# Patient Record
Sex: FEMALE | Race: BLACK OR AFRICAN AMERICAN | NOT HISPANIC OR LATINO | Employment: FULL TIME | URBAN - METROPOLITAN AREA
[De-identification: names, ages, dates, MRNs, and addresses within clinical notes are randomized per-mention and may not be internally consistent; named-entity substitution may affect disease eponyms.]

---

## 2017-02-21 RX ORDER — LABETALOL 200 MG/1
TABLET, FILM COATED ORAL
Qty: 360 TABLET | Refills: 0 | Status: SHIPPED | OUTPATIENT
Start: 2017-02-21 | End: 2017-06-13 | Stop reason: SDUPTHER

## 2017-06-13 RX ORDER — LABETALOL 200 MG/1
TABLET, FILM COATED ORAL
Qty: 360 TABLET | Refills: 0 | Status: SHIPPED | OUTPATIENT
Start: 2017-06-13 | End: 2017-10-12 | Stop reason: SDUPTHER

## 2017-06-13 RX ORDER — NIFEDIPINE 90 MG/1
TABLET, EXTENDED RELEASE ORAL
Qty: 90 TABLET | Refills: 0 | Status: SHIPPED | OUTPATIENT
Start: 2017-06-13 | End: 2017-10-12 | Stop reason: SDUPTHER

## 2017-06-21 ENCOUNTER — OFFICE VISIT (OUTPATIENT)
Dept: CARDIOLOGY | Facility: CLINIC | Age: 57
End: 2017-06-21

## 2017-06-21 VITALS
SYSTOLIC BLOOD PRESSURE: 140 MMHG | HEIGHT: 61 IN | DIASTOLIC BLOOD PRESSURE: 80 MMHG | BODY MASS INDEX: 22.66 KG/M2 | WEIGHT: 120 LBS | HEART RATE: 64 BPM

## 2017-06-21 DIAGNOSIS — R00.2 PALPITATIONS: ICD-10-CM

## 2017-06-21 DIAGNOSIS — I10 ESSENTIAL HYPERTENSION: Primary | ICD-10-CM

## 2017-06-21 PROCEDURE — 93000 ELECTROCARDIOGRAM COMPLETE: CPT | Performed by: INTERNAL MEDICINE

## 2017-06-21 PROCEDURE — 99214 OFFICE O/P EST MOD 30 MIN: CPT | Performed by: INTERNAL MEDICINE

## 2017-06-21 NOTE — PROGRESS NOTES
Subjective:     Encounter Date: 6/22/2017      Patient ID: Micheline Fournier is a 57 y.o. female.    Chief Complaint:  History of Present Illness    Dear Dr. Mcginnis,    I had the pleasure of seeing this patient in the office today for follow-up of her cardiac status.  As you know she has a history of hypertension as well as palpitations.    Since her last visit she's done generally fine without complaints.  The only thing she's noted is that some mornings, about an hour to 2 after she takes her morning medicines, she'll have some lightheadedness.  She takes her labetalol twice a day, and takes her nifedipine and spironolactone both in the morning.    This patient denies any chest pain, pressure, tightness, squeezing, or heartburn.  This patient has not experienced any feeling of  no presyncope or syncope.  She has rare palpitations that are not associated with any symptoms.  There has not been any orthopnea or PND, and no problems with lower extremity edema.  This patient denies any shortness of breath at rest or with activity and has not had any wheezing.  This patient has not had any problems with unexplained nausea or vomiting. The patient has continued to perform daily activities of living without any specific problem or change in the level of activity.  This patient has not been recently hospitalized for any reason.      The following portions of the patient's history were reviewed and updated as appropriate: allergies, current medications, past family history, past medical history, past social history, past surgical history and problem list.    Past Medical History:   Diagnosis Date   • Arrhythmia    • Hypertension    • Palpitations        No past surgical history on file.    Social History     Social History   • Marital status:      Spouse name: N/A   • Number of children: N/A   • Years of education: N/A     Occupational History   • Not on file.     Social History Main Topics   • Smoking status: Never  "Smoker   • Smokeless tobacco: Not on file   • Alcohol use Yes      Comment: occ.   • Drug use: Not on file      Comment: caffine use   • Sexual activity: Not on file     Other Topics Concern   • Not on file     Social History Narrative       Review of Systems   Constitution: Negative for chills, decreased appetite, fever and night sweats.   HENT: Negative for ear discharge, ear pain, hearing loss, nosebleeds and sore throat.    Eyes: Negative for blurred vision, double vision and pain.   Cardiovascular: Negative for cyanosis.   Respiratory: Negative for hemoptysis and sputum production.    Endocrine: Negative for cold intolerance and heat intolerance.   Hematologic/Lymphatic: Negative for adenopathy.   Skin: Negative for dry skin, itching, nail changes, rash and suspicious lesions.   Musculoskeletal: Negative for arthritis, gout, muscle cramps, muscle weakness, myalgias and neck pain.   Gastrointestinal: Negative for anorexia, bowel incontinence, constipation, diarrhea, dysphagia, hematemesis and jaundice.   Genitourinary: Negative for bladder incontinence, dysuria, flank pain, frequency, hematuria and nocturia.   Neurological: Negative for focal weakness, numbness, paresthesias and seizures.   Psychiatric/Behavioral: Negative for altered mental status, hallucinations, hypervigilance, suicidal ideas and thoughts of violence.   Allergic/Immunologic: Negative for persistent infections.         ECG 12 Lead  Date/Time: 6/21/2017 11:02 AM  Performed by: GUILLERMINA ALFARO III.  Authorized by: GUILLERMINA ALFARO III   Comparison: compared with previous ECG   Similar to previous ECG  Rhythm: sinus rhythm  Rate: normal  Conduction: conduction normal  ST Segments: ST segments normal  T Waves: T waves normal  QRS axis: normal  Other: no other findings  Clinical impression: normal ECG               Objective:     Vitals:    06/21/17 1012   BP: 140/80   Pulse: 64   Weight: 120 lb (54.4 kg)   Height: 61\" (154.9 cm)         Physical " Exam   Constitutional: She is oriented to person, place, and time. She appears well-developed and well-nourished. No distress.   HENT:   Head: Normocephalic and atraumatic.   Nose: Nose normal.   Mouth/Throat: Oropharynx is clear and moist.   Eyes: Conjunctivae and EOM are normal. Pupils are equal, round, and reactive to light. Right eye exhibits no discharge. Left eye exhibits no discharge.   Neck: Normal range of motion. Neck supple. No tracheal deviation present. No thyromegaly present.   Cardiovascular: Normal rate, regular rhythm, S1 normal, S2 normal, normal heart sounds and normal pulses.  Exam reveals no S3.    Pulmonary/Chest: Effort normal and breath sounds normal. No stridor. No respiratory distress. She exhibits no tenderness.   Abdominal: Soft. Bowel sounds are normal. She exhibits no distension and no mass. There is no tenderness. There is no rebound and no guarding.   Musculoskeletal: Normal range of motion. She exhibits no tenderness or deformity.   Lymphadenopathy:     She has no cervical adenopathy.   Neurological: She is alert and oriented to person, place, and time. She has normal reflexes.   Skin: Skin is warm and dry. No rash noted. She is not diaphoretic. No erythema.   Psychiatric: She has a normal mood and affect. Thought content normal.       Lab Review:           Performed      Assessment:          Diagnosis Plan   1. Essential hypertension  ECG 12 Lead   2. Palpitations  ECG 12 Lead          Plan:       1.  Hypertension-generally under good control.  She is having a little lightheadedness some mornings, and so I'm then have her move her neck felt pain to take in the evening to [place].  She will call me in 2 weeks to let me know if that helps  2.  Palpitations-rare palpitations, continue medical therapy    Thank you very much for allowing us to participate in the care of this pleasant patient.  Please don't hesitate to call if I can be of assistance in any way.      Current Outpatient  Prescriptions:   •  aspirin 81 MG tablet, Take 81 mg by mouth daily., Disp: , Rfl:   •  labetalol (NORMODYNE) 200 MG tablet, TAKE TWO TABLETS BY MOUTH TWICE DAILY, Disp: 360 tablet, Rfl: 0  •  MULTIPLE VITAMIN PO, Take  by mouth., Disp: , Rfl:   •  NIFEdipine XL (PROCARDIA XL) 90 MG 24 hr tablet, TAKE ONE TABLET BY MOUTH ONCE DAILY AS DIRECTED, Disp: 90 tablet, Rfl: 0  •  spironolactone (ALDACTONE) 25 MG tablet, Take 1 tablet by mouth Daily., Disp: 90 tablet, Rfl: 1

## 2017-06-21 NOTE — PATIENT INSTRUCTIONS
Move your nifedipine (Procardia) to each evening. Call me in 2 weeks to let me know if that helps.

## 2017-10-12 RX ORDER — LABETALOL 200 MG/1
TABLET, FILM COATED ORAL
Qty: 360 TABLET | Refills: 0 | Status: SHIPPED | OUTPATIENT
Start: 2017-10-12 | End: 2018-02-13 | Stop reason: SDUPTHER

## 2017-10-12 RX ORDER — NIFEDIPINE 90 MG/1
TABLET, EXTENDED RELEASE ORAL
Qty: 90 TABLET | Refills: 0 | Status: SHIPPED | OUTPATIENT
Start: 2017-10-12 | End: 2018-02-13 | Stop reason: SDUPTHER

## 2018-02-13 RX ORDER — SPIRONOLACTONE 25 MG/1
TABLET ORAL
Qty: 90 TABLET | Refills: 0 | Status: SHIPPED | OUTPATIENT
Start: 2018-02-13 | End: 2018-06-11 | Stop reason: SDUPTHER

## 2018-02-13 RX ORDER — NIFEDIPINE 90 MG/1
TABLET, FILM COATED, EXTENDED RELEASE ORAL
Qty: 90 TABLET | Refills: 0 | Status: SHIPPED | OUTPATIENT
Start: 2018-02-13 | End: 2018-07-13 | Stop reason: SDUPTHER

## 2018-02-13 RX ORDER — LABETALOL 200 MG/1
TABLET, FILM COATED ORAL
Qty: 360 TABLET | Refills: 0 | Status: SHIPPED | OUTPATIENT
Start: 2018-02-13 | End: 2018-06-11 | Stop reason: SDUPTHER

## 2018-06-11 RX ORDER — LABETALOL 200 MG/1
TABLET, FILM COATED ORAL
Qty: 360 TABLET | Refills: 0 | Status: SHIPPED | OUTPATIENT
Start: 2018-06-11 | End: 2018-09-26 | Stop reason: SDUPTHER

## 2018-06-11 RX ORDER — SPIRONOLACTONE 25 MG/1
TABLET ORAL
Qty: 90 TABLET | Refills: 0 | Status: SHIPPED | OUTPATIENT
Start: 2018-06-11 | End: 2018-09-26 | Stop reason: SDUPTHER

## 2018-07-11 ENCOUNTER — OFFICE VISIT (OUTPATIENT)
Dept: CARDIOLOGY | Facility: CLINIC | Age: 58
End: 2018-07-11

## 2018-07-11 VITALS
DIASTOLIC BLOOD PRESSURE: 82 MMHG | SYSTOLIC BLOOD PRESSURE: 138 MMHG | HEIGHT: 61 IN | HEART RATE: 58 BPM | BODY MASS INDEX: 23.41 KG/M2 | WEIGHT: 124 LBS

## 2018-07-11 DIAGNOSIS — I10 ESSENTIAL HYPERTENSION: Primary | ICD-10-CM

## 2018-07-11 DIAGNOSIS — R00.2 PALPITATIONS: ICD-10-CM

## 2018-07-11 PROCEDURE — 93000 ELECTROCARDIOGRAM COMPLETE: CPT | Performed by: INTERNAL MEDICINE

## 2018-07-11 PROCEDURE — 99213 OFFICE O/P EST LOW 20 MIN: CPT | Performed by: INTERNAL MEDICINE

## 2018-07-11 NOTE — PROGRESS NOTES
Subjective:     Encounter Date: 7/11/2018      Patient ID: Micheline Fournier is a 58 y.o. female.    Chief Complaint: HTN  History of Present Illness    Dear Dr. Mcginnis,    I had the pleasure of seeing this patient in the office today for follow-up of her cardiac status.  As you know she has a history of hypertension as well as palpitations.    Generally she's been doing great without any complaints.  She does note that with the hot weather that occasionally by the end of the day if she standing up most of the day that she will have some trivial lower extremity edema.  Outside of that, no other symptoms.This patient denies any chest pain, pressure, tightness, squeezing, or heartburn.  This patient has not experienced any feeling of palpitations, tachycardia or heart racing and no presyncope or syncope.  There has not been any problems with dizziness or lightheadedness.  There has not been any orthopnea or PND.  This patient denies any shortness of breath at rest or with activity and has not had any wheezing.  This patient has not had any problems with unexplained nausea or vomiting. The patient has continued to perform daily activities of living without any specific problem or change in the level of activity.  This patient has not been recently hospitalized for any reason.    This patient has no known cardiac history.  This patient has no history of coronary artery disease, congestive heart failure, rheumatic fever, rheumatic heart disease, congenital heart disease or heart murmur.  This patient has never required invasive cardiovascular evaluation.        The following portions of the patient's history were reviewed and updated as appropriate: allergies, current medications, past family history, past medical history, past social history, past surgical history and problem list.    Past Medical History:   Diagnosis Date   • Arrhythmia    • Hypertension    • Palpitations        History reviewed. No pertinent surgical  history.    Social History     Social History   • Marital status:      Spouse name: N/A   • Number of children: N/A   • Years of education: N/A     Occupational History   • Not on file.     Social History Main Topics   • Smoking status: Never Smoker   • Smokeless tobacco: Not on file   • Alcohol use Yes      Comment: occ.   • Drug use: Unknown      Comment: caffine use   • Sexual activity: Not on file     Other Topics Concern   • Not on file     Social History Narrative   • No narrative on file       Review of Systems   Constitution: Negative for chills, decreased appetite, fever and night sweats.   HENT: Negative for ear discharge, ear pain, hearing loss, nosebleeds and sore throat.    Eyes: Negative for blurred vision, double vision and pain.   Cardiovascular: Negative for cyanosis.   Respiratory: Negative for hemoptysis and sputum production.    Endocrine: Negative for cold intolerance and heat intolerance.   Hematologic/Lymphatic: Negative for adenopathy.   Skin: Negative for dry skin, itching, nail changes, rash and suspicious lesions.   Musculoskeletal: Negative for arthritis, gout, muscle cramps, muscle weakness, myalgias and neck pain.   Gastrointestinal: Negative for anorexia, bowel incontinence, constipation, diarrhea, dysphagia, hematemesis and jaundice.   Genitourinary: Negative for bladder incontinence, dysuria, flank pain, frequency, hematuria and nocturia.   Neurological: Negative for focal weakness, numbness, paresthesias and seizures.   Psychiatric/Behavioral: Negative for altered mental status, hallucinations, hypervigilance, suicidal ideas and thoughts of violence.   Allergic/Immunologic: Negative for persistent infections.         ECG 12 Lead  Date/Time: 7/11/2018 10:25 AM  Performed by: GUILLERMINA ALFARO III  Authorized by: GUILLERMINA ALFARO III   Comparison: compared with previous ECG   Similar to previous ECG  Rhythm: sinus rhythm  Rate: normal  Conduction: conduction normal  ST Segments:  "ST segments normal  T Waves: T waves normal  QRS axis: normal  Other: no other findings  Clinical impression: normal ECG               Objective:     Vitals:    07/11/18 1003   BP: 138/82   Pulse: 58   Weight: 56.2 kg (124 lb)   Height: 154.9 cm (61\")         Physical Exam   Constitutional: She is oriented to person, place, and time. She appears well-developed and well-nourished. No distress.   HENT:   Head: Normocephalic and atraumatic.   Nose: Nose normal.   Mouth/Throat: Oropharynx is clear and moist.   Eyes: Conjunctivae and EOM are normal. Pupils are equal, round, and reactive to light. Right eye exhibits no discharge. Left eye exhibits no discharge.   Neck: Normal range of motion. Neck supple. No tracheal deviation present. No thyromegaly present.   Cardiovascular: Normal rate, regular rhythm, S1 normal, S2 normal, normal heart sounds and normal pulses.  Exam reveals no S3.    Pulmonary/Chest: Effort normal and breath sounds normal. No stridor. No respiratory distress. She exhibits no tenderness.   Abdominal: Soft. Bowel sounds are normal. She exhibits no distension and no mass. There is no tenderness. There is no rebound and no guarding.   Musculoskeletal: Normal range of motion. She exhibits no tenderness or deformity.   Lymphadenopathy:     She has no cervical adenopathy.   Neurological: She is alert and oriented to person, place, and time. She has normal reflexes.   Skin: Skin is warm and dry. No rash noted. She is not diaphoretic. No erythema.   Psychiatric: She has a normal mood and affect. Thought content normal.       Lab Review:           Performed      Assessment:          Diagnosis Plan   1. Essential hypertension  ECG 12 Lead   2. Palpitations  ECG 12 Lead          Plan:       1.  Hypertension- Good control, continue current medical therapy  2.  Palpitations-rare palpitations, continue medical therapy    Thank you very much for allowing us to participate in the care of this pleasant patient.  " Please don't hesitate to call if I can be of assistance in any way.      Current Outpatient Prescriptions:   •  1, Take 1 tablet by mouth Daily., Disp: , Rfl:   •  aspirin 81 MG tablet, Take 81 mg by mouth daily., Disp: , Rfl:   •  Homeopathic Products (T-RELIEF PAIN ARNICA + 12) TAB & OINT therapy, Take 1 tablet by mouth Daily., Disp: , Rfl:   •  labetalol (NORMODYNE) 200 MG tablet, TAKE 2 TABLETS BY MOUTH TWICE DAILY, Disp: 360 tablet, Rfl: 0  •  MULTIPLE VITAMIN PO, Take  by mouth., Disp: , Rfl:   •  NIFEdipine CC (ADALAT CC) 90 MG 24 hr tablet, TAKE ONE TABLET BY MOUTH ONCE DAILY AS DIRECTED, Disp: 90 tablet, Rfl: 0  •  spironolactone (ALDACTONE) 25 MG tablet, TAKE 1 TABLET BY MOUTH ONCE DAILY, Disp: 90 tablet, Rfl: 0  •  OMEGA-3 FATTY ACIDS PO, Take 1 tablet by mouth Daily., Disp: , Rfl:

## 2018-07-13 RX ORDER — NIFEDIPINE 90 MG/1
TABLET, FILM COATED, EXTENDED RELEASE ORAL
Qty: 90 TABLET | Refills: 1 | Status: SHIPPED | OUTPATIENT
Start: 2018-07-13 | End: 2019-04-26 | Stop reason: SDUPTHER

## 2018-09-26 RX ORDER — LABETALOL 200 MG/1
TABLET, FILM COATED ORAL
Qty: 360 TABLET | Refills: 2 | Status: SHIPPED | OUTPATIENT
Start: 2018-09-26 | End: 2019-10-24 | Stop reason: SDUPTHER

## 2018-09-26 RX ORDER — SPIRONOLACTONE 25 MG/1
TABLET ORAL
Qty: 90 TABLET | Refills: 2 | Status: SHIPPED | OUTPATIENT
Start: 2018-09-26 | End: 2019-09-12 | Stop reason: SDUPTHER

## 2019-04-29 RX ORDER — NIFEDIPINE 90 MG/1
TABLET, FILM COATED, EXTENDED RELEASE ORAL
Qty: 90 TABLET | Refills: 1 | Status: SHIPPED | OUTPATIENT
Start: 2019-04-29 | End: 2020-02-18

## 2019-07-10 ENCOUNTER — OFFICE VISIT (OUTPATIENT)
Dept: CARDIOLOGY | Facility: CLINIC | Age: 59
End: 2019-07-10

## 2019-07-10 VITALS
BODY MASS INDEX: 25.34 KG/M2 | WEIGHT: 134.2 LBS | HEIGHT: 61 IN | HEART RATE: 61 BPM | DIASTOLIC BLOOD PRESSURE: 84 MMHG | SYSTOLIC BLOOD PRESSURE: 150 MMHG

## 2019-07-10 DIAGNOSIS — I10 ESSENTIAL HYPERTENSION: Chronic | ICD-10-CM

## 2019-07-10 DIAGNOSIS — R00.2 PALPITATIONS: Primary | Chronic | ICD-10-CM

## 2019-07-10 PROCEDURE — 93000 ELECTROCARDIOGRAM COMPLETE: CPT | Performed by: INTERNAL MEDICINE

## 2019-07-10 PROCEDURE — 99213 OFFICE O/P EST LOW 20 MIN: CPT | Performed by: INTERNAL MEDICINE

## 2019-07-10 NOTE — PROGRESS NOTES
Subjective:     Encounter Date: 7/10/2019      Patient ID: Micheline Fournier is a 59 y.o. female.    Chief Complaint: HTN  History of Present Illness    I had the pleasure of seeing this patient in the office today for follow-up of her cardiac status.  As you know she has a history of hypertension as well as palpitations.    He is doing well with no cardiac complaints.  She denies any chest pain, pressure, tightness, squeezing, or heartburn.  She has not experienced any feeling of palpitations, tachycardia or heart racing and no presyncope or syncope.  There have not been any problems with dizziness or lightheadedness.  There have not been any orthopnea or PND, and no problems with lower extremity edema.  She denies any shortness of breath at rest or with activity and has not had any wheezing.  She has not had any problems with unexplained nausea or vomiting. She has continued to perform daily activities of living without any specific problem or change in the level of activity.  She has not been recently hospitalized for any reason.    This patient has no known cardiac history.  This patient has no history of coronary artery disease, congestive heart failure, rheumatic fever, rheumatic heart disease, congenital heart disease or heart murmur.  This patient has never required invasive cardiovascular evaluation.        The following portions of the patient's history were reviewed and updated as appropriate: allergies, current medications, past family history, past medical history, past social history, past surgical history and problem list.    Past Medical History:   Diagnosis Date   • Arrhythmia    • Hypertension    • Palpitations        History reviewed. No pertinent surgical history.    Social History     Socioeconomic History   • Marital status:      Spouse name: Not on file   • Number of children: Not on file   • Years of education: Not on file   • Highest education level: Not on file   Tobacco Use   •  "Smoking status: Never Smoker   Substance and Sexual Activity   • Alcohol use: Yes     Comment: occ.       Review of Systems   Constitution: Negative for chills, decreased appetite, fever and night sweats.   HENT: Negative for ear discharge, ear pain, hearing loss, nosebleeds and sore throat.    Eyes: Negative for blurred vision, double vision and pain.   Cardiovascular: Negative for cyanosis.   Respiratory: Negative for hemoptysis and sputum production.    Endocrine: Negative for cold intolerance and heat intolerance.   Hematologic/Lymphatic: Negative for adenopathy.   Skin: Negative for dry skin, itching, nail changes, rash and suspicious lesions.   Musculoskeletal: Negative for arthritis, gout, muscle cramps, muscle weakness, myalgias and neck pain.   Gastrointestinal: Negative for anorexia, bowel incontinence, constipation, diarrhea, dysphagia, hematemesis and jaundice.   Genitourinary: Negative for bladder incontinence, dysuria, flank pain, frequency, hematuria and nocturia.   Neurological: Negative for focal weakness, numbness, paresthesias and seizures.   Psychiatric/Behavioral: Negative for altered mental status, hallucinations, hypervigilance, suicidal ideas and thoughts of violence.   Allergic/Immunologic: Negative for persistent infections.         ECG 12 Lead  Date/Time: 7/10/2019 12:29 PM  Performed by: Tomy Jackson III, MD  Authorized by: Tomy Jackson III, MD   Comparison: compared with previous ECG   Similar to previous ECG  Rhythm: sinus rhythm  Rate: normal  Conduction: conduction normal  ST Segments: ST segments normal  T Waves: T waves normal  QRS axis: normal  Other: no other findings    Clinical impression: normal ECG               Objective:     Vitals:    07/10/19 1001   BP: 150/84   Pulse: 61   Weight: 60.9 kg (134 lb 3.2 oz)   Height: 154.9 cm (61\")         Physical Exam   Constitutional: She is oriented to person, place, and time. She appears well-developed and well-nourished. No " distress.   HENT:   Head: Normocephalic and atraumatic.   Nose: Nose normal.   Mouth/Throat: Oropharynx is clear and moist.   Eyes: Conjunctivae and EOM are normal. Pupils are equal, round, and reactive to light. Right eye exhibits no discharge. Left eye exhibits no discharge.   Neck: Normal range of motion. Neck supple. No tracheal deviation present. No thyromegaly present.   Cardiovascular: Normal rate, regular rhythm, S1 normal, S2 normal, normal heart sounds and normal pulses. Exam reveals no S3.   Pulmonary/Chest: Effort normal and breath sounds normal. No stridor. No respiratory distress. She exhibits no tenderness.   Abdominal: Soft. Bowel sounds are normal. She exhibits no distension and no mass. There is no tenderness. There is no rebound and no guarding.   Musculoskeletal: Normal range of motion. She exhibits no tenderness or deformity.   Lymphadenopathy:     She has no cervical adenopathy.   Neurological: She is alert and oriented to person, place, and time. She has normal reflexes.   Skin: Skin is warm and dry. No rash noted. She is not diaphoretic. No erythema.   Psychiatric: She has a normal mood and affect. Thought content normal.       Lab Review:           Performed      Assessment:          Diagnosis Plan   1. Palpitations  ECG 12 Lead   2. Essential hypertension  ECG 12 Lead          Plan:       1.  Hypertension- Good control, continue current medical therapy  2.  Palpitations-rare palpitations, continue medical therapy    Thank you very much for allowing us to participate in the care of this pleasant patient.  Please don't hesitate to call if I can be of assistance in any way.      Current Outpatient Medications:   •  aspirin 81 MG tablet, Take 81 mg by mouth daily., Disp: , Rfl:   •  labetalol (NORMODYNE) 200 MG tablet, TAKE 2 TABLETS BY MOUTH TWICE DAILY, Disp: 360 tablet, Rfl: 2  •  MULTIPLE VITAMIN PO, Take 1 tablet by mouth Daily., Disp: , Rfl:   •  NIFEdipine CC (ADALAT CC) 90 MG 24 hr  tablet, TAKE 1 TABLET BY MOUTH ONCE DAILY AS DIRECTED, Disp: 90 tablet, Rfl: 1  •  OMEGA-3 FATTY ACIDS PO, Take 1 tablet by mouth Daily., Disp: , Rfl:   •  spironolactone (ALDACTONE) 25 MG tablet, TAKE 1 TABLET BY MOUTH ONCE DAILY, Disp: 90 tablet, Rfl: 2

## 2019-09-12 RX ORDER — SPIRONOLACTONE 25 MG/1
TABLET ORAL
Qty: 90 TABLET | Refills: 2 | Status: SHIPPED | OUTPATIENT
Start: 2019-09-12 | End: 2020-06-08

## 2019-10-24 RX ORDER — LABETALOL 200 MG/1
TABLET, FILM COATED ORAL
Qty: 360 TABLET | Refills: 2 | Status: SHIPPED | OUTPATIENT
Start: 2019-10-24 | End: 2020-06-24 | Stop reason: SDUPTHER

## 2020-02-18 RX ORDER — NIFEDIPINE 90 MG/1
TABLET, FILM COATED, EXTENDED RELEASE ORAL
Qty: 90 TABLET | Refills: 1 | Status: SHIPPED | OUTPATIENT
Start: 2020-02-18 | End: 2020-06-24 | Stop reason: SDUPTHER

## 2020-06-08 RX ORDER — SPIRONOLACTONE 25 MG/1
TABLET ORAL
Qty: 30 TABLET | Refills: 0 | Status: SHIPPED | OUTPATIENT
Start: 2020-06-08 | End: 2020-06-24 | Stop reason: SDUPTHER

## 2020-06-24 ENCOUNTER — OFFICE VISIT (OUTPATIENT)
Dept: CARDIOLOGY | Facility: CLINIC | Age: 60
End: 2020-06-24

## 2020-06-24 ENCOUNTER — LAB (OUTPATIENT)
Dept: LAB | Facility: HOSPITAL | Age: 60
End: 2020-06-24

## 2020-06-24 VITALS
DIASTOLIC BLOOD PRESSURE: 80 MMHG | HEART RATE: 93 BPM | HEIGHT: 61 IN | WEIGHT: 122 LBS | BODY MASS INDEX: 23.03 KG/M2 | SYSTOLIC BLOOD PRESSURE: 158 MMHG

## 2020-06-24 DIAGNOSIS — I10 ESSENTIAL HYPERTENSION: Chronic | ICD-10-CM

## 2020-06-24 DIAGNOSIS — I10 ESSENTIAL HYPERTENSION: Primary | Chronic | ICD-10-CM

## 2020-06-24 DIAGNOSIS — R00.2 PALPITATIONS: Chronic | ICD-10-CM

## 2020-06-24 LAB
ALBUMIN SERPL-MCNC: 5.2 G/DL (ref 3.5–5.2)
ALBUMIN/GLOB SERPL: 1.9 G/DL
ALP SERPL-CCNC: 60 U/L (ref 39–117)
ALT SERPL W P-5'-P-CCNC: 32 U/L (ref 1–33)
ANION GAP SERPL CALCULATED.3IONS-SCNC: 13.6 MMOL/L (ref 5–15)
AST SERPL-CCNC: 30 U/L (ref 1–32)
BILIRUB SERPL-MCNC: 0.9 MG/DL (ref 0.2–1.2)
BUN BLD-MCNC: 17 MG/DL (ref 8–23)
BUN/CREAT SERPL: 17.3 (ref 7–25)
CALCIUM SPEC-SCNC: 10 MG/DL (ref 8.6–10.5)
CHLORIDE SERPL-SCNC: 99 MMOL/L (ref 98–107)
CHOLEST SERPL-MCNC: 215 MG/DL (ref 0–200)
CO2 SERPL-SCNC: 27.4 MMOL/L (ref 22–29)
CREAT BLD-MCNC: 0.98 MG/DL (ref 0.57–1)
GFR SERPL CREATININE-BSD FRML MDRD: 70 ML/MIN/1.73
GLOBULIN UR ELPH-MCNC: 2.8 GM/DL
GLUCOSE BLD-MCNC: 91 MG/DL (ref 65–99)
HDLC SERPL-MCNC: 91 MG/DL (ref 40–60)
LDLC SERPL CALC-MCNC: 114 MG/DL (ref 0–100)
LDLC/HDLC SERPL: 1.25 {RATIO}
POTASSIUM BLD-SCNC: 4 MMOL/L (ref 3.5–5.2)
PROT SERPL-MCNC: 8 G/DL (ref 6–8.5)
SODIUM BLD-SCNC: 140 MMOL/L (ref 136–145)
TRIGL SERPL-MCNC: 52 MG/DL (ref 0–150)
VLDLC SERPL-MCNC: 10.4 MG/DL (ref 7–27)

## 2020-06-24 PROCEDURE — 80053 COMPREHEN METABOLIC PANEL: CPT

## 2020-06-24 PROCEDURE — 99213 OFFICE O/P EST LOW 20 MIN: CPT | Performed by: INTERNAL MEDICINE

## 2020-06-24 PROCEDURE — 36415 COLL VENOUS BLD VENIPUNCTURE: CPT

## 2020-06-24 PROCEDURE — 80061 LIPID PANEL: CPT

## 2020-06-24 PROCEDURE — 93000 ELECTROCARDIOGRAM COMPLETE: CPT | Performed by: INTERNAL MEDICINE

## 2020-06-24 RX ORDER — NIFEDIPINE 90 MG/1
90 TABLET, FILM COATED, EXTENDED RELEASE ORAL DAILY
Qty: 90 TABLET | Refills: 3 | Status: SHIPPED | OUTPATIENT
Start: 2020-06-24 | End: 2021-07-06

## 2020-06-24 RX ORDER — LABETALOL 200 MG/1
400 TABLET, FILM COATED ORAL 2 TIMES DAILY
Qty: 360 TABLET | Refills: 3 | Status: SHIPPED | OUTPATIENT
Start: 2020-06-24 | End: 2021-07-06

## 2020-06-24 RX ORDER — SPIRONOLACTONE 25 MG/1
TABLET ORAL
Qty: 90 TABLET | Refills: 3 | Status: SHIPPED | OUTPATIENT
Start: 2020-06-24 | End: 2021-07-06

## 2020-06-24 NOTE — PROGRESS NOTES
Subjective:     Encounter Date: 06/24/20        Patient ID: Micheline Fournier is a 60 y.o. female.    Chief Complaint: HTN  History of Present Illness    I had the pleasure of seeing this patient in the office today for follow-up of her cardiac status.  As you know she has a history of hypertension as well as palpitations.    She has been doing great without any issues. She denies any chest pain, pressure, tightness, squeezing, or heartburn.  She has not experienced any feeling of palpitations, tachycardia or heart racing and no presyncope or syncope.  There has not been any problems with dizziness or lightheadedness.  There has not been any orthopnea or PND, and no problems with lower extremity edema.  She denies any shortness of breath at rest or with activity and has not had any wheezing.  She has continued to perform daily activities of living without any specific problem or change in the level of activity.    This patient has no known cardiac history.  This patient has no history of coronary artery disease, congestive heart failure, rheumatic fever, rheumatic heart disease, congenital heart disease or heart murmur.  This patient has never required invasive cardiovascular evaluation.        The following portions of the patient's history were reviewed and updated as appropriate: allergies, current medications, past family history, past medical history, past social history, past surgical history and problem list.    Past Medical History:   Diagnosis Date   • Arrhythmia    • Hypertension    • Palpitations        History reviewed. No pertinent surgical history.    Social History     Socioeconomic History   • Marital status:      Spouse name: Not on file   • Number of children: Not on file   • Years of education: Not on file   • Highest education level: Not on file   Tobacco Use   • Smoking status: Never Smoker   • Smokeless tobacco: Never Used   Substance and Sexual Activity   • Alcohol use: Yes      "Comment: occ.       Review of Systems   Constitution: Negative for chills, decreased appetite, fever and night sweats.   HENT: Negative for ear discharge, ear pain, hearing loss, nosebleeds and sore throat.    Eyes: Negative for blurred vision, double vision and pain.   Cardiovascular: Negative for cyanosis.   Respiratory: Negative for hemoptysis and sputum production.    Endocrine: Negative for cold intolerance and heat intolerance.   Hematologic/Lymphatic: Negative for adenopathy.   Skin: Negative for dry skin, itching, nail changes, rash and suspicious lesions.   Musculoskeletal: Negative for arthritis, gout, muscle cramps, muscle weakness, myalgias and neck pain.   Gastrointestinal: Negative for anorexia, bowel incontinence, constipation, diarrhea, dysphagia, hematemesis and jaundice.   Genitourinary: Negative for bladder incontinence, dysuria, flank pain, frequency, hematuria and nocturia.   Neurological: Negative for focal weakness, numbness, paresthesias and seizures.   Psychiatric/Behavioral: Negative for altered mental status, hallucinations, hypervigilance, suicidal ideas and thoughts of violence.   Allergic/Immunologic: Negative for persistent infections.         ECG 12 Lead  Date/Time: 6/24/2020 2:32 PM  Performed by: Tomy Jackson III, MD  Authorized by: Tomy Jackson III, MD   Comparison: compared with previous ECG   Similar to previous ECG  Rhythm: sinus rhythm  Rate: normal  Conduction: conduction normal  ST Segments: ST segments normal  T Waves: T waves normal  QRS axis: normal  Other: no other findings    Clinical impression: normal ECG               Objective:     Vitals:    06/24/20 1415   BP: 158/80   Pulse: 93   Weight: 55.3 kg (122 lb)   Height: 154.9 cm (61\")         Physical Exam   Constitutional: She is oriented to person, place, and time. She appears well-developed and well-nourished. No distress.   HENT:   Head: Normocephalic and atraumatic.   Nose: Nose normal.   Mouth/Throat: " Oropharynx is clear and moist.   Eyes: Pupils are equal, round, and reactive to light. Conjunctivae and EOM are normal. Right eye exhibits no discharge. Left eye exhibits no discharge.   Neck: Normal range of motion. Neck supple. No tracheal deviation present. No thyromegaly present.   Cardiovascular: Normal rate, regular rhythm, S1 normal, S2 normal, normal heart sounds and normal pulses. Exam reveals no S3.   Pulmonary/Chest: Effort normal and breath sounds normal. No stridor. No respiratory distress. She exhibits no tenderness.   Abdominal: Soft. Bowel sounds are normal. She exhibits no distension and no mass. There is no tenderness. There is no rebound and no guarding.   Musculoskeletal: Normal range of motion. She exhibits no tenderness or deformity.   Lymphadenopathy:     She has no cervical adenopathy.   Neurological: She is alert and oriented to person, place, and time. She has normal reflexes.   Skin: Skin is warm and dry. No rash noted. She is not diaphoretic. No erythema.   Psychiatric: She has a normal mood and affect. Thought content normal.       Lab Review:           Performed      Assessment:          Diagnosis Plan   1. Essential hypertension  Comprehensive Metabolic Panel    Lipid Panel   2. Palpitations  Comprehensive Metabolic Panel    Lipid Panel          Plan:       1.  Hypertension- Good control, continue current medical therapy  2.  Palpitations-rare palpitations, continue medical therapy    Thank you very much for allowing us to participate in the care of this pleasant patient.  Please don't hesitate to call if I can be of assistance in any way.      Current Outpatient Medications:   •  aspirin 81 MG tablet, Take 81 mg by mouth daily., Disp: , Rfl:   •  labetalol (NORMODYNE) 200 MG tablet, TAKE 2 TABLETS BY MOUTH TWICE DAILY, Disp: 360 tablet, Rfl: 2  •  MULTIPLE VITAMIN PO, Take 1 tablet by mouth Daily., Disp: , Rfl:   •  NIFEdipine CC (ADALAT CC) 90 MG 24 hr tablet, TAKE 1 TABLET BY MOUTH  ONCE DAILY AS DIRECTED, Disp: 90 tablet, Rfl: 1  •  OMEGA-3 FATTY ACIDS PO, Take 1 tablet by mouth Daily., Disp: , Rfl:   •  spironolactone (ALDACTONE) 25 MG tablet, Take 1 tablet by mouth once daily. Needs appointment for further refills. Please call [phone], Disp: 30 tablet, Rfl: 0

## 2021-06-21 ENCOUNTER — OFFICE VISIT (OUTPATIENT)
Dept: CARDIOLOGY | Facility: CLINIC | Age: 61
End: 2021-06-21

## 2021-06-21 VITALS
HEART RATE: 64 BPM | HEIGHT: 61 IN | DIASTOLIC BLOOD PRESSURE: 76 MMHG | SYSTOLIC BLOOD PRESSURE: 124 MMHG | BODY MASS INDEX: 23.79 KG/M2 | WEIGHT: 126 LBS

## 2021-06-21 DIAGNOSIS — R00.2 PALPITATIONS: Primary | Chronic | ICD-10-CM

## 2021-06-21 DIAGNOSIS — I10 ESSENTIAL HYPERTENSION: Chronic | ICD-10-CM

## 2021-06-21 PROCEDURE — 93000 ELECTROCARDIOGRAM COMPLETE: CPT | Performed by: INTERNAL MEDICINE

## 2021-06-21 PROCEDURE — 99213 OFFICE O/P EST LOW 20 MIN: CPT | Performed by: INTERNAL MEDICINE

## 2021-06-21 NOTE — PROGRESS NOTES
Subjective:     Encounter Date: 06/21/21        Patient ID: Micheline Fournier is a 61 y.o. female.    Chief Complaint: HTN  History of Present Illness    I had the pleasure of seeing this patient in the office today for follow-up of her cardiac status.  As you know she has a history of hypertension as well as palpitations.    She has been doing fine.  She just had her Covid vaccination.  This was her first 1, she had the Materna, she is scheduled for the second in a few weeks.  She held off getting it because a concern with her autoimmune disease.    She is doing great from a heart standpoint, with no symptoms.  No chest pain or chest discomfort.  Blood pressures been under good control.  No palpitations.    This patient has no known cardiac history.  This patient has no history of coronary artery disease, congestive heart failure, rheumatic fever, rheumatic heart disease, congenital heart disease or heart murmur.  This patient has never required invasive cardiovascular evaluation.        The following portions of the patient's history were reviewed and updated as appropriate: allergies, current medications, past family history, past medical history, past social history, past surgical history and problem list.    Past Medical History:   Diagnosis Date   • Arrhythmia    • Hypertension    • Palpitations        History reviewed. No pertinent surgical history.    Social History     Socioeconomic History   • Marital status:      Spouse name: Not on file   • Number of children: Not on file   • Years of education: Not on file   • Highest education level: Not on file   Tobacco Use   • Smoking status: Never Smoker   • Smokeless tobacco: Never Used   Substance and Sexual Activity   • Alcohol use: Yes     Comment: occ.           ECG 12 Lead    Date/Time: 6/21/2021 3:26 PM  Performed by: Tomy Jackson III, MD  Authorized by: Tomy Jackson III, MD   Comparison: compared with previous ECG   Similar to previous  "ECG  Rhythm: sinus rhythm  Rate: normal  Conduction: conduction normal  ST Segments: ST segments normal  T Waves: T waves normal  QRS axis: normal  Other: no other findings    Clinical impression: normal ECG               Objective:     Vitals:    06/21/21 1355   BP: 124/76   Pulse: 64   Weight: 57.2 kg (126 lb)   Height: 154.9 cm (61\")         Physical Exam  Constitutional:       General: She is not in acute distress.     Appearance: She is well-developed. She is not diaphoretic.   HENT:      Head: Normocephalic and atraumatic.      Nose: Nose normal.   Eyes:      General:         Right eye: No discharge.         Left eye: No discharge.      Conjunctiva/sclera: Conjunctivae normal.      Pupils: Pupils are equal, round, and reactive to light.   Neck:      Thyroid: No thyromegaly.      Trachea: No tracheal deviation.   Cardiovascular:      Rate and Rhythm: Normal rate and regular rhythm.      Pulses: Normal pulses.      Heart sounds: Normal heart sounds, S1 normal and S2 normal. No S3 sounds.    Pulmonary:      Effort: Pulmonary effort is normal. No respiratory distress.      Breath sounds: Normal breath sounds. No stridor.   Chest:      Chest wall: No tenderness.   Abdominal:      General: Bowel sounds are normal. There is no distension.      Palpations: Abdomen is soft. There is no mass.      Tenderness: There is no abdominal tenderness. There is no guarding or rebound.   Musculoskeletal:         General: No tenderness or deformity. Normal range of motion.      Cervical back: Normal range of motion and neck supple.   Lymphadenopathy:      Cervical: No cervical adenopathy.   Skin:     General: Skin is warm and dry.      Findings: No erythema or rash.   Neurological:      Mental Status: She is alert and oriented to person, place, and time.      Deep Tendon Reflexes: Reflexes are normal and symmetric.   Psychiatric:         Thought Content: Thought content normal.         Lab Review:           Lab Results   Component " Value Date    GLUCOSE 91 06/24/2020    BUN 17 06/24/2020    CREATININE 0.98 06/24/2020    EGFRIFAFRI 70 06/24/2020    BCR 17.3 06/24/2020    K 4.0 06/24/2020    CO2 27.4 06/24/2020    CALCIUM 10.0 06/24/2020    ALBUMIN 5.20 06/24/2020    AST 30 06/24/2020    ALT 32 06/24/2020           Assessment:          Diagnosis Plan   1. Palpitations  ECG 12 Lead   2. Essential hypertension  ECG 12 Lead          Plan:       1.  Hypertension- Good control, continue current medical therapy with the labetalol and nifedipine, along with the spironolactone  2.  Palpitations-rare palpitations, continue medical therapy with the labetalol, doing well    Thank you very much for allowing us to participate in the care of this pleasant patient.  Please don't hesitate to call if I can be of assistance in any way.      Current Outpatient Medications:   •  aspirin 81 MG tablet, Take 81 mg by mouth daily., Disp: , Rfl:   •  labetalol (NORMODYNE) 200 MG tablet, Take 2 tablets by mouth 2 (Two) Times a Day., Disp: 360 tablet, Rfl: 3  •  MULTIPLE VITAMIN PO, Take 1 tablet by mouth Daily., Disp: , Rfl:   •  NIFEdipine CC (ADALAT CC) 90 MG 24 hr tablet, Take 1 tablet by mouth Daily. as directed, Disp: 90 tablet, Rfl: 3  •  OMEGA-3 FATTY ACIDS PO, Take 1 tablet by mouth Daily., Disp: , Rfl:   •  spironolactone (ALDACTONE) 25 MG tablet, Take 1 tablet by mouth once daily. Needs appointment for further refills. Please call [phone], Disp: 90 tablet, Rfl: 3

## 2021-07-03 DIAGNOSIS — I10 ESSENTIAL HYPERTENSION: Chronic | ICD-10-CM

## 2021-07-03 DIAGNOSIS — R00.2 PALPITATIONS: Chronic | ICD-10-CM

## 2021-07-06 RX ORDER — SPIRONOLACTONE 25 MG/1
TABLET ORAL
Qty: 90 TABLET | Refills: 3 | Status: SHIPPED | OUTPATIENT
Start: 2021-07-06 | End: 2022-08-02 | Stop reason: SDUPTHER

## 2021-07-06 RX ORDER — NIFEDIPINE 90 MG/1
TABLET, FILM COATED, EXTENDED RELEASE ORAL
Qty: 90 TABLET | Refills: 3 | Status: SHIPPED | OUTPATIENT
Start: 2021-07-06 | End: 2022-08-02 | Stop reason: SDUPTHER

## 2021-07-06 RX ORDER — LABETALOL 200 MG/1
TABLET, FILM COATED ORAL
Qty: 360 TABLET | Refills: 3 | Status: SHIPPED | OUTPATIENT
Start: 2021-07-06 | End: 2022-10-17 | Stop reason: SDUPTHER

## 2022-08-01 ENCOUNTER — OFFICE VISIT (OUTPATIENT)
Dept: CARDIOLOGY | Facility: CLINIC | Age: 62
End: 2022-08-01

## 2022-08-01 VITALS
DIASTOLIC BLOOD PRESSURE: 68 MMHG | HEIGHT: 61 IN | WEIGHT: 120 LBS | BODY MASS INDEX: 22.66 KG/M2 | HEART RATE: 70 BPM | SYSTOLIC BLOOD PRESSURE: 106 MMHG

## 2022-08-01 DIAGNOSIS — R00.2 PALPITATIONS: Primary | Chronic | ICD-10-CM

## 2022-08-01 DIAGNOSIS — I10 PRIMARY HYPERTENSION: Chronic | ICD-10-CM

## 2022-08-01 PROCEDURE — 93000 ELECTROCARDIOGRAM COMPLETE: CPT | Performed by: INTERNAL MEDICINE

## 2022-08-01 PROCEDURE — 99214 OFFICE O/P EST MOD 30 MIN: CPT | Performed by: INTERNAL MEDICINE

## 2022-08-01 NOTE — PROGRESS NOTES
Subjective:     Encounter Date: 08/01/22        Patient ID: Micheline Fournier is a 62 y.o. female.    Chief Complaint: HTN  History of Present Illness    I had the pleasure of seeing this patient in the office today for follow-up of her cardiac status.  As you know she has a history of hypertension as well as palpitations.    She feels like things have been going great.  No chest pain or chest discomfort, no shortness of breath.  No palpitations or tachycardia no presyncope or syncope.  She has not had any orthopnea or PND.  No chills or fevers.  She has not had COVID that she knows of, but this thinks at one point she may have but she never tested positive.    This patient has no known cardiac history.  This patient has no history of coronary artery disease, congestive heart failure, rheumatic fever, rheumatic heart disease, congenital heart disease or heart murmur.  This patient has never required invasive cardiovascular evaluation.    The following portions of the patient's history were reviewed and updated as appropriate: allergies, current medications, past family history, past medical history, past social history, past surgical history and problem list.    Past Medical History:   Diagnosis Date   • Arrhythmia    • Hypertension    • Palpitations        History reviewed. No pertinent surgical history.    Social History     Socioeconomic History   • Marital status:    Tobacco Use   • Smoking status: Never Smoker   • Smokeless tobacco: Never Used   Substance and Sexual Activity   • Alcohol use: Yes     Comment: occ.   • Drug use: Never     Comment: caffine use           ECG 12 Lead    Date/Time: 8/1/2022 11:34 AM  Performed by: Tomy Jackson III, MD  Authorized by: Tomy Jackson III, MD   Comparison: compared with previous ECG   Similar to previous ECG  Rhythm: sinus rhythm  Rate: normal  Conduction: conduction normal  ST Segments: ST segments normal  T Waves: T waves normal  QRS axis: normal  Other: no  "other findings    Clinical impression: normal ECG               Objective:     Vitals:    08/01/22 1103   BP: 106/68   Pulse: 70   Weight: 54.4 kg (120 lb)   Height: 154.9 cm (61\")         Physical Exam  Constitutional:       General: She is not in acute distress.     Appearance: She is well-developed. She is not diaphoretic.   HENT:      Head: Normocephalic and atraumatic.      Nose: Nose normal.   Eyes:      General:         Right eye: No discharge.         Left eye: No discharge.      Conjunctiva/sclera: Conjunctivae normal.      Pupils: Pupils are equal, round, and reactive to light.   Neck:      Thyroid: No thyromegaly.      Trachea: No tracheal deviation.   Cardiovascular:      Rate and Rhythm: Normal rate and regular rhythm.      Pulses: Normal pulses.      Heart sounds: Normal heart sounds, S1 normal and S2 normal.     No S3 sounds.   Pulmonary:      Effort: Pulmonary effort is normal. No respiratory distress.      Breath sounds: Normal breath sounds. No stridor.   Chest:      Chest wall: No tenderness.   Abdominal:      General: Bowel sounds are normal. There is no distension.      Palpations: Abdomen is soft. There is no mass.      Tenderness: There is no abdominal tenderness. There is no guarding or rebound.   Musculoskeletal:         General: No tenderness or deformity. Normal range of motion.      Cervical back: Normal range of motion and neck supple.   Lymphadenopathy:      Cervical: No cervical adenopathy.   Skin:     General: Skin is warm and dry.      Findings: No erythema or rash.   Neurological:      Mental Status: She is alert and oriented to person, place, and time.      Deep Tendon Reflexes: Reflexes are normal and symmetric.   Psychiatric:         Thought Content: Thought content normal.         Lab Review:           Lab Results   Component Value Date    GLUCOSE 91 06/24/2020    BUN 17 06/24/2020    CREATININE 0.98 06/24/2020    EGFRIFAFRI 70 06/24/2020    BCR 17.3 06/24/2020    K 4.0 " 06/24/2020    CO2 27.4 06/24/2020    CALCIUM 10.0 06/24/2020    ALBUMIN 5.20 06/24/2020    AST 30 06/24/2020    ALT 32 06/24/2020           Assessment:          Diagnosis Plan   1. Palpitations     2. Primary hypertension            Plan:       1.  Hypertension- Good control, continue current medical therapy with the labetalol and nifedipine, along with the spironolactone  2.  Palpitations-rare palpitations, continue medical therapy with the labetalol, doing well    Thank you very much for allowing us to participate in the care of this pleasant patient.  Please don't hesitate to call if I can be of assistance in any way.      Current Outpatient Medications:   •  aspirin 81 MG tablet, Take 81 mg by mouth daily., Disp: , Rfl:   •  labetalol (NORMODYNE) 200 MG tablet, Take 2 tablets by mouth twice daily, Disp: 360 tablet, Rfl: 3  •  MULTIPLE VITAMIN PO, Take 1 tablet by mouth Daily., Disp: , Rfl:   •  NIFEdipine CC (ADALAT CC) 90 MG 24 hr tablet, TAKE 1 TABLET BY MOUTH ONCE DAILY AS DIRECTED, Disp: 90 tablet, Rfl: 3  •  OMEGA-3 FATTY ACIDS PO, Take 1 tablet by mouth Daily., Disp: , Rfl:   •  spironolactone (ALDACTONE) 25 MG tablet, Take 1 tablet by mouth once daily, Disp: 90 tablet, Rfl: 3

## 2022-08-02 DIAGNOSIS — I10 ESSENTIAL HYPERTENSION: Chronic | ICD-10-CM

## 2022-08-02 DIAGNOSIS — R00.2 PALPITATIONS: Chronic | ICD-10-CM

## 2022-08-02 RX ORDER — NIFEDIPINE 90 MG/1
90 TABLET, FILM COATED, EXTENDED RELEASE ORAL DAILY
Qty: 90 TABLET | Refills: 3 | Status: SHIPPED | OUTPATIENT
Start: 2022-08-02

## 2022-08-02 RX ORDER — SPIRONOLACTONE 25 MG/1
TABLET ORAL
Qty: 90 TABLET | Refills: 3 | Status: SHIPPED | OUTPATIENT
Start: 2022-08-02

## 2022-08-05 ENCOUNTER — OFFICE VISIT (OUTPATIENT)
Dept: FAMILY MEDICINE CLINIC | Facility: CLINIC | Age: 62
End: 2022-08-05

## 2022-08-05 VITALS
DIASTOLIC BLOOD PRESSURE: 71 MMHG | OXYGEN SATURATION: 97 % | WEIGHT: 122 LBS | HEIGHT: 61 IN | RESPIRATION RATE: 12 BRPM | SYSTOLIC BLOOD PRESSURE: 139 MMHG | BODY MASS INDEX: 23.03 KG/M2 | TEMPERATURE: 96.1 F | HEART RATE: 63 BPM

## 2022-08-05 DIAGNOSIS — R53.83 FATIGUE, UNSPECIFIED TYPE: ICD-10-CM

## 2022-08-05 DIAGNOSIS — Z00.00 ROUTINE PHYSICAL EXAMINATION: Primary | ICD-10-CM

## 2022-08-05 DIAGNOSIS — I10 PRIMARY HYPERTENSION: ICD-10-CM

## 2022-08-05 DIAGNOSIS — F32.A ANXIETY AND DEPRESSION: ICD-10-CM

## 2022-08-05 DIAGNOSIS — F41.9 ANXIETY AND DEPRESSION: ICD-10-CM

## 2022-08-05 DIAGNOSIS — R73.9 HYPERGLYCEMIA: ICD-10-CM

## 2022-08-05 PROCEDURE — 99213 OFFICE O/P EST LOW 20 MIN: CPT | Performed by: FAMILY MEDICINE

## 2022-08-05 PROCEDURE — 99386 PREV VISIT NEW AGE 40-64: CPT | Performed by: FAMILY MEDICINE

## 2022-08-05 RX ORDER — BUSPIRONE HYDROCHLORIDE 5 MG/1
5 TABLET ORAL 3 TIMES DAILY PRN
Qty: 90 TABLET | Refills: 0 | Status: SHIPPED | OUTPATIENT
Start: 2022-08-05 | End: 2023-02-07

## 2022-08-05 RX ORDER — ESCITALOPRAM OXALATE 5 MG/1
5 TABLET ORAL DAILY
Qty: 90 TABLET | Refills: 0 | Status: SHIPPED | OUTPATIENT
Start: 2022-08-05 | End: 2022-11-07 | Stop reason: SDUPTHER

## 2022-08-06 NOTE — PROGRESS NOTES
"Subjective   Micheline Fournier is a 62 y.o. female.     Chief Complaint   Patient presents with   • Establish Care     New pt   • Annual Exam     physical       History of Present Illness     She presents today to establish care and for routine physical.    She states she has been having issues with anxiety and slight depression. She notes that she has occasional stress going on in her life currently. The patient lost her bother on 09/2021 and she has not gotten over his death. Her eldest son who passed away and his birthday was on 07/30/2022. 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 The patient states she is trying to \"deal with it\". She notes that she has had issues with depression intermittently for years and keeps herself occupied by working a lot. The patient states her anxiety wakes her up in the middle of the night similar to a panic attack. She notes she is open to trying medication for her anxiety. The patient reports previously taking medication for her depression following the death of her son. She adds she did not like the way it made her feel; therefore, this is the cause of her hesitant to medication. The patient adds she felt \"numb\" when previously taking medication. She states in the past she has tried Xanax, Zoloft, Lexapro, and Buspar. The patient affirms  Buspar and Lexapro were helpful. She denies homicidal, or suicidal ideations. The patient reports having feelings of not wanting to do anything. She reports she does not \"get much night-time sleep\" due to her shift hours at her job and she complains she does not sleep well.     She affirms to having  Vitiligo disease and she was diagnosed at approximately 27-years-old. The patient denies that her autoimmune disease has any relation to her depression, or anxiety.     She denies having any sinus issues; however, a few weeks ago she had allergies. She has some lymph nodes, no pain " during palpitation.    The patient states that she currently takes aspirin, is taking labetalol, nifedipine, and spironolactone for hypertension. She follows up with Dr. Jackson, cardiology, and he manages her blood pressure medications. She last saw Dr. Jackson on 2022.    She completed a colonoscopy  approximately 2 years ago and this was of normal findings. She affirms to having polyps in the past. She recently completed her Pap smear test on 2022. She denies ever having shingles vaccine. She states that she has had 2 doses of the COVID-19 vaccines and does not wish to receive any booster doses. She denies getting the influenza and pneumonia vaccines. She has not had blood work done since . She denies being diagnosed with hyperglycemia with the exception of during 1 of her pregnancies. The patient states for her diet she is leaning more towards plant based; however, she reports not being fully vegetarian. Additionally, she donates blood often and has been told her iron is very low, it was brought to her attention that it could be her diet. She notes she is occasionally fatigued; however, not often. She reports in the past she has had problems with her vitamin D level and she is currently taking vitamin D supplements.    The patient has medical history of hypertension, palpitations, arrythmias, and Vitiligo.     She denies previous surgeries.     She notes that she has family history of hypertension and diabetes mellitus, in both maternal and paternal side. She denies a family history of cancer. She confirms her parent's are both .    Her previous primary physician was Dr. Tiffany Deras she denies tobacco use. She occasionally drinks alcohol. 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 She is .     Past Medical History:   Diagnosis Date   • Arrhythmia    • Hypertension    • Palpitations        History reviewed. No pertinent surgical history.    Family History   Problem  "Relation Age of Onset   • Hypertension Mother    • Diabetes Mother    • Hypertension Father        Social History     Socioeconomic History   • Marital status:    • Number of children: 4   Tobacco Use   • Smoking status: Never Smoker   • Smokeless tobacco: Never Used   Vaping Use   • Vaping Use: Never used   Substance and Sexual Activity   • Alcohol use: Yes     Comment: occ.   • Drug use: Never     Comment: caffine use       The following portions of the patient's history were reviewed and updated as appropriate: allergies, current medications, past family history, past medical history, past social history, past surgical history and problem list.    Review of Systems   Psychiatric/Behavioral:        Positive for depression and anxiety.        I have reviewed ROS yes    Objective   Vitals:    08/05/22 1515   BP: 139/71   Pulse: 63   Resp: 12   Temp: 96.1 °F (35.6 °C)   TempSrc: Infrared   SpO2: 97%   Weight: 55.3 kg (122 lb)   Height: 154.9 cm (61\")     Body mass index is 23.05 kg/m².  Physical Exam  Constitutional:       Appearance: Normal appearance. She is well-developed.   HENT:      Head: Normocephalic and atraumatic.      Right Ear: Tympanic membrane, ear canal and external ear normal.      Left Ear: Tympanic membrane, ear canal and external ear normal.      Nose: Nose normal.      Mouth/Throat:      Mouth: Mucous membranes are moist.   Eyes:      General: No scleral icterus.     Extraocular Movements: Extraocular movements intact.      Conjunctiva/sclera: Conjunctivae normal.      Pupils: Pupils are equal, round, and reactive to light.   Neck:      Thyroid: No thyromegaly.   Cardiovascular:      Rate and Rhythm: Normal rate and regular rhythm.      Pulses: Normal pulses.      Heart sounds: Normal heart sounds.   Pulmonary:      Effort: Pulmonary effort is normal. No respiratory distress.      Breath sounds: Normal breath sounds. No wheezing or rales.   Abdominal:      General: Bowel sounds are normal. " There is no distension.      Palpations: Abdomen is soft. There is no mass.      Tenderness: There is no abdominal tenderness.      Hernia: No hernia is present.   Musculoskeletal:         General: No swelling or tenderness. Normal range of motion.      Cervical back: Normal range of motion and neck supple.   Lymphadenopathy:      Cervical: No cervical adenopathy.   Skin:     General: Skin is warm.   Neurological:      General: No focal deficit present.      Mental Status: She is alert and oriented to person, place, and time.      Cranial Nerves: No cranial nerve deficit.      Sensory: No sensory deficit.      Deep Tendon Reflexes: Reflexes normal.   Psychiatric:         Mood and Affect: Mood normal.         Behavior: Behavior normal.         Thought Content: Thought content normal.         Judgment: Judgment normal.         I personally examined this patient yes   Assessment & Plan   Problem List Items Addressed This Visit        Cardiac and Vasculature    Hypertension (Chronic)    Relevant Orders    Comprehensive Metabolic Panel    Lipid Panel      Other Visit Diagnoses     Routine physical examination    -  Primary    Relevant Orders    Comprehensive Metabolic Panel    Lipid Panel    TSH+Free T4    Hyperglycemia        Relevant Orders    Hemoglobin A1c    Fatigue, unspecified type        Relevant Orders    Vitamin B12 & Folate    Anxiety and depression        Relevant Medications    busPIRone (BUSPAR) 5 MG tablet    escitalopram (Lexapro) 5 MG tablet        Routine physical  Micheline Fournier is a 62-year-old patient who is here to establish care and for a routine physical. Preventive care is discussed with patient. She is up to date with colonoscopy, Pap smear, and mammogram. We will check fasting blood glucose and lipids; she will return to complete these as discussed. She is also due for   Zoster vaccine  And COVID-19 vaccine.  Recommended to patient      Anxiety and depression  She is also seen today for  depression and anxiety complaining of depression that has been intermittent for a long time. She has been on medication in the past,; however, she discontinued the medication due to side effects.   The patient has a history of vitiligo and she denies any depression, or anxiety because of her appearance. I will check TSH, T4, and CBC. Also medication discussed with the patient I will start her on  Buspar 5 mg as needed for acute anxiety and I will start her with 5 mg Lexapro. Side effects explained to patient.     The patient will follow-up in 3 months. If she is having any side effects with the newly prescribed medications she will stop taking the medication and contact the office for further instruction.    No follow-ups on file.              Transcribed from ambient dictation for Funmilayo Sparrow MD by Sherie Bear.  08/05/22   23:46 EDT    Patient verbalized consent to the visit recording.

## 2022-10-17 DIAGNOSIS — R00.2 PALPITATIONS: Chronic | ICD-10-CM

## 2022-10-17 DIAGNOSIS — I10 ESSENTIAL HYPERTENSION: Chronic | ICD-10-CM

## 2022-10-17 RX ORDER — LABETALOL 200 MG/1
400 TABLET, FILM COATED ORAL 2 TIMES DAILY
Qty: 360 TABLET | Refills: 3 | Status: SHIPPED | OUTPATIENT
Start: 2022-10-17

## 2022-10-17 NOTE — TELEPHONE ENCOUNTER
Patient has requested a refill on Labetalol 200 mg 2 tabs BID to be sent in to [facility] on [address]    Next OV-08/07/23-AMARILYS  Last OV-08/01/22-AMARILYS    Plan:      Plan       1.  Hypertension- Good control, continue current medical therapy with the labetalol and nifedipine, along with the spironolactone  2.  Palpitations-rare palpitations, continue medical therapy with the labetalol, doing well    Delmer

## 2022-11-07 ENCOUNTER — OFFICE VISIT (OUTPATIENT)
Dept: FAMILY MEDICINE CLINIC | Facility: CLINIC | Age: 62
End: 2022-11-07

## 2022-11-07 VITALS
OXYGEN SATURATION: 99 % | DIASTOLIC BLOOD PRESSURE: 69 MMHG | SYSTOLIC BLOOD PRESSURE: 129 MMHG | TEMPERATURE: 96.6 F | WEIGHT: 122 LBS | HEART RATE: 73 BPM | HEIGHT: 61 IN | BODY MASS INDEX: 23.03 KG/M2

## 2022-11-07 DIAGNOSIS — E78.2 MIXED HYPERLIPIDEMIA: ICD-10-CM

## 2022-11-07 DIAGNOSIS — F41.9 ANXIETY AND DEPRESSION: Primary | ICD-10-CM

## 2022-11-07 DIAGNOSIS — R73.9 HYPERGLYCEMIA: ICD-10-CM

## 2022-11-07 DIAGNOSIS — F32.A ANXIETY AND DEPRESSION: Primary | ICD-10-CM

## 2022-11-07 PROBLEM — Z12.11 SCREEN FOR COLON CANCER: Status: ACTIVE | Noted: 2020-09-23

## 2022-11-07 PROCEDURE — 99214 OFFICE O/P EST MOD 30 MIN: CPT | Performed by: FAMILY MEDICINE

## 2022-11-07 RX ORDER — ESCITALOPRAM OXALATE 10 MG/1
10 TABLET ORAL DAILY
Qty: 90 TABLET | Refills: 0 | Status: SHIPPED | OUTPATIENT
Start: 2022-11-07

## 2022-11-07 RX ORDER — TRAZODONE HYDROCHLORIDE 50 MG/1
25 TABLET ORAL NIGHTLY
Qty: 90 TABLET | Refills: 0 | Status: SHIPPED | OUTPATIENT
Start: 2022-11-07

## 2022-11-07 RX ORDER — ESCITALOPRAM OXALATE 5 MG/1
10 TABLET ORAL DAILY
Qty: 90 TABLET | Refills: 0 | Status: SHIPPED | OUTPATIENT
Start: 2022-11-07 | End: 2022-11-07

## 2022-11-07 NOTE — PROGRESS NOTES
"Chief Complaint  Hyperglycemia (3 mth f/u), Hyperlipidemia, Anxiety, and Depression    Subjective        Micheline Fournier presents to [facility]  History of Present Illness follow-up on anxiety, depression and hyperlipidemia   Patient has long history of hyperlipidemia denies any body ache, nausea vomiting.  Denies any problem with medication.  Patient with history of anxiety depression and started on BuSpar and 5 mg of Lexapro.  She does not think that 5 mg Lexapro is helping her.  Denies any SI or HI.  She is also complaining of difficulty in sleeping.    Objective   Vital Signs:  /69   Pulse 73   Temp 96.6 °F (35.9 °C)   Ht 154.9 cm (61\")   Wt 55.3 kg (122 lb)   SpO2 99%   BMI 23.05 kg/m²   Estimated body mass index is 23.05 kg/m² as calculated from the following:    Height as of this encounter: 154.9 cm (61\").    Weight as of this encounter: 55.3 kg (122 lb).    BMI is within normal parameters. No other follow-up for BMI required.      Physical Exam  Constitutional:       General: She is not in acute distress.     Appearance: Normal appearance. She is well-developed.   HENT:      Head: Normocephalic and atraumatic.      Right Ear: Tympanic membrane normal.      Left Ear: Tympanic membrane normal.      Mouth/Throat:      Mouth: Mucous membranes are moist.   Eyes:      General:         Right eye: No discharge.         Left eye: No discharge.      Extraocular Movements: Extraocular movements intact.      Pupils: Pupils are equal, round, and reactive to light.   Cardiovascular:      Rate and Rhythm: Normal rate and regular rhythm.      Heart sounds: Normal heart sounds.   Pulmonary:      Effort: Pulmonary effort is normal.      Breath sounds: Normal breath sounds. No wheezing or rales.   Abdominal:      General: Bowel sounds are normal.      Palpations: Abdomen is soft. There is no mass.      Tenderness: There is no abdominal tenderness.   Musculoskeletal:      Cervical back: " Normal range of motion and neck supple.      Right lower leg: No edema.      Left lower leg: No edema.   Lymphadenopathy:      Cervical: No cervical adenopathy.   Neurological:      General: No focal deficit present.      Mental Status: She is alert and oriented to person, place, and time.        Result Review :    Common labs    Common Labs 8/8/22 8/8/22 8/8/22    1110 1110 1110   Glucose 93     BUN 13     Creatinine 1.07 (A)     Sodium 142     Potassium 4.0     Chloride 104     Calcium 9.5     Total Protein 6.5     Albumin 4.6     Total Bilirubin 0.4     Alkaline Phosphatase 61     AST (SGOT) 23     ALT (SGPT) 22     Total Cholesterol  223 (A)    Triglycerides  48    HDL Cholesterol  78    LDL Cholesterol   137 (A)    Hemoglobin A1C   5.7 (A)   (A) Abnormal value       Comments are available for some flowsheets but are not being displayed.                    The 10-year ASCVD risk score (Christa CARRILLO, et al., 2019) is: 7.4%    Values used to calculate the score:      Age: 62 years      Sex: Female      Is Non- : Yes      Diabetic: No      Tobacco smoker: No      Systolic Blood Pressure: 129 mmHg      Is BP treated: Yes      HDL Cholesterol: 78 mg/dL      Total Cholesterol: 223 mg/dL     Assessment and Plan   Diagnoses and all orders for this visit:    1. Anxiety and depression (Primary)  -     Discontinue: escitalopram (Lexapro) 5 MG tablet; Take 2 tablets by mouth Daily.  Dispense: 90 tablet; Refill: 0  -     escitalopram (Lexapro) 10 MG tablet; Take 1 tablet by mouth Daily.  Dispense: 90 tablet; Refill: 0    2. Mixed hyperlipidemia  -     Comprehensive Metabolic Panel; Future  -     Lipid Panel; Future    3. Hyperglycemia  -     Hemoglobin A1c; Future    Other orders  -     traZODone (DESYREL) 50 MG tablet; Take 0.5 tablets by mouth Every Night.  Dispense: 90 tablet; Refill: 0    Micheline Fournier is a 62-year-old female who presents today for follow-up on anxiety and depression.     1. Anxiety  and depression  - She was started on 5 mg of Lexapro and buspirone. She does not think the medications are helping. Lexapro will be increased to 10 mg and buspirone 5 mg as needed.     2. Insomnia  - She will start on trazodone. Side effects explained to the patient. She will message me through [employer] if there is no improvement. Follow up as needed or in 3 months.     3. Hyperlipidemia   - Her 10-year risk for cardiovascular disease was discussed. The patient is intermediate risk. She is very particular with her diet, but she is eating 2 to 3 eggs daily. She will cut down her egg intake, and make more changes in her diet.  She is hesitant to start the medication.   lipids will be rechecked in 3 months. Information on healthy heart diet given to the patient. Follow up as needed or in 3 months.           Follow Up   No follow-ups on file.  Patient was given instructions and counseling regarding her condition or for health maintenance advice. Please see specific information pulled into the AVS if appropriate.       Answers for HPI/ROS submitted by the patient on 10/31/2022  Please describe your symptoms.: Anxiety and depression  Have you had these symptoms before?: Yes  How long have you been having these symptoms?: Greater than 2 weeks  Please list any medications you are currently taking for this condition.: Lexapro , Buspirone  Please describe any probable cause for these symptoms. : Anxiety, Depression  What is the primary reason for your visit?: Other    Transcribed from ambient dictation for Funmilayo Sparrow MD by Sarkis Domingo.  11/07/22   12:40 EST    Patient or patient representative verbalized consent to the visit recording.  I have personally performed the services described in this document as transcribed by the above individual, and it is both accurate and complete.

## 2023-02-07 ENCOUNTER — LAB (OUTPATIENT)
Dept: LAB | Facility: HOSPITAL | Age: 63
End: 2023-02-07
Payer: COMMERCIAL

## 2023-02-07 ENCOUNTER — OFFICE VISIT (OUTPATIENT)
Dept: FAMILY MEDICINE CLINIC | Facility: CLINIC | Age: 63
End: 2023-02-07
Payer: COMMERCIAL

## 2023-02-07 ENCOUNTER — HOSPITAL ENCOUNTER (OUTPATIENT)
Dept: GENERAL RADIOLOGY | Facility: HOSPITAL | Age: 63
Discharge: HOME | End: 2023-02-07
Payer: COMMERCIAL

## 2023-02-07 VITALS
TEMPERATURE: 96.2 F | SYSTOLIC BLOOD PRESSURE: 181 MMHG | HEART RATE: 62 BPM | WEIGHT: 121 LBS | OXYGEN SATURATION: 99 % | DIASTOLIC BLOOD PRESSURE: 78 MMHG | HEIGHT: 61 IN | BODY MASS INDEX: 22.84 KG/M2

## 2023-02-07 DIAGNOSIS — M25.552 ACUTE HIP PAIN, BILATERAL: ICD-10-CM

## 2023-02-07 DIAGNOSIS — F32.A ANXIETY AND DEPRESSION: Primary | ICD-10-CM

## 2023-02-07 DIAGNOSIS — I10 PRIMARY HYPERTENSION: ICD-10-CM

## 2023-02-07 DIAGNOSIS — M25.551 ACUTE HIP PAIN, BILATERAL: ICD-10-CM

## 2023-02-07 DIAGNOSIS — F41.9 ANXIETY AND DEPRESSION: Primary | ICD-10-CM

## 2023-02-07 DIAGNOSIS — R53.83 OTHER FATIGUE: ICD-10-CM

## 2023-02-07 DIAGNOSIS — E78.2 MIXED HYPERLIPIDEMIA: ICD-10-CM

## 2023-02-07 DIAGNOSIS — R10.32 LEFT LOWER QUADRANT ABDOMINAL PAIN: ICD-10-CM

## 2023-02-07 LAB
T4 FREE SERPL-MCNC: 1.1 NG/DL (ref 0.93–1.7)
TSH SERPL DL<=0.05 MIU/L-ACNC: 1.98 UIU/ML (ref 0.27–4.2)

## 2023-02-07 PROCEDURE — 83036 HEMOGLOBIN GLYCOSYLATED A1C: CPT | Performed by: FAMILY MEDICINE

## 2023-02-07 PROCEDURE — 80053 COMPREHEN METABOLIC PANEL: CPT | Performed by: FAMILY MEDICINE

## 2023-02-07 PROCEDURE — 99214 OFFICE O/P EST MOD 30 MIN: CPT | Performed by: FAMILY MEDICINE

## 2023-02-07 PROCEDURE — 84439 ASSAY OF FREE THYROXINE: CPT | Performed by: FAMILY MEDICINE

## 2023-02-07 PROCEDURE — 84443 ASSAY THYROID STIM HORMONE: CPT | Performed by: FAMILY MEDICINE

## 2023-02-07 PROCEDURE — 73521 X-RAY EXAM HIPS BI 2 VIEWS: CPT

## 2023-02-07 PROCEDURE — 80061 LIPID PANEL: CPT | Performed by: FAMILY MEDICINE

## 2023-02-07 NOTE — PROGRESS NOTES
"Chief Complaint  Anxiety and Depression (fu)    Subjective        Micheline Fournier presents to [facility]  History of Present Illness follow-up on anxiety depression she is also complaining of left lower abdominal pain and bilateral hip pain her pain is more on the left pain hip.  Her last colonoscopy was done in October 2020.  Denies any history of diverticulosis or diverticulitis.  Denies any nausea or vomiting.  Her pain is more in her hips.  History of depression and anxiety stable on current medication  ..  She is also here for follow-up on hypertension and hyperlipidemia history of hypertension blood pressure elevated in the office admits to missing the doses in the morning.  She is also seeing cardiology denies any headache blurring of vision.    Objective   Vital Signs:  BP (!) 181/78   Pulse 62   Temp 96.2 °F (35.7 °C) (Temporal)   Ht 154.9 cm (61\")   Wt 54.9 kg (121 lb)   SpO2 99%   BMI 22.86 kg/m²   Estimated body mass index is 22.86 kg/m² as calculated from the following:    Height as of this encounter: 154.9 cm (61\").    Weight as of this encounter: 54.9 kg (121 lb).       BMI is within normal parameters. No other follow-up for BMI required.      Physical Exam  Constitutional:       Appearance: Normal appearance. She is well-developed.   HENT:      Head: Normocephalic and atraumatic.      Right Ear: Tympanic membrane, ear canal and external ear normal.      Left Ear: Tympanic membrane, ear canal and external ear normal.      Nose: Nose normal.      Mouth/Throat:      Mouth: Mucous membranes are moist.   Eyes:      General: No scleral icterus.     Extraocular Movements: Extraocular movements intact.      Conjunctiva/sclera: Conjunctivae normal.      Pupils: Pupils are equal, round, and reactive to light.   Neck:      Thyroid: No thyromegaly.   Cardiovascular:      Rate and Rhythm: Normal rate and regular rhythm.      Pulses: Normal pulses.      Heart sounds: Normal " heart sounds.   Pulmonary:      Effort: Pulmonary effort is normal. No respiratory distress.      Breath sounds: Normal breath sounds. No wheezing or rales.   Abdominal:      General: Bowel sounds are normal. There is no distension.      Palpations: Abdomen is soft. There is no mass.      Tenderness: There is no abdominal tenderness.      Hernia: No hernia is present.   Musculoskeletal:         General: No swelling or tenderness. Normal range of motion.      Cervical back: Normal range of motion and neck supple.   Lymphadenopathy:      Cervical: No cervical adenopathy.   Skin:     General: Skin is warm.   Neurological:      General: No focal deficit present.      Mental Status: She is alert and oriented to person, place, and time.      Cranial Nerves: No cranial nerve deficit.      Sensory: No sensory deficit.      Deep Tendon Reflexes: Reflexes normal.   Psychiatric:         Mood and Affect: Mood normal.         Behavior: Behavior normal.         Thought Content: Thought content normal.         Judgment: Judgment normal.        Result Review :                   Assessment and Plan   Diagnoses and all orders for this visit:    1. Anxiety and depression (Primary)    2. Mixed hyperlipidemia    3. Primary hypertension    4. Left lower quadrant abdominal pain    5. Acute hip pain, bilateral  -     XR Hips Bilateral With or Without Pelvis 2 View; Future    6. Other fatigue  -     TSH+Free T4      Micheline Christine is a 62-year-old female patient came here for follow-up on  Anxiety and depression, symptoms are stable on current medication PHQ done she still have some anxiety.  But patient thinks medication is helping her with managing her symptoms .  Denies any SI or HI  Hyperlipidemia, we will check lipids and CMP today  Hypertension, blood pressure is not at goal I advised her not to miss the medication and also talk to her cardiology about changing her labetalol to beta-blocker longer acting acting once a day beta-blocker  she has appointment with  him in few months will talk to him about the medication changes.  Acute bilateral hip pain and left lower abdominal pain.  I reviewed her colonoscopy results.  She is complaining of more hip pain than left lower quadrant pain I will do the x-ray bilateral hip with pelvis.  Take ibuprofen or Aleve as needed for pain follow-up in 2 weeks.  If there is no improvement we will consider doing CT scan of abdomen and pelvis     I spent 40 minutes caring for Micheline on this date of service. This time includes time spent by me in the following activities:reviewing tests, obtaining and/or reviewing a separately obtained history, performing a medically appropriate examination and/or evaluation , counseling and educating the patient/family/caregiver, ordering medications, tests, or procedures and documenting information in the medical record  Follow Up   No follow-ups on file.  Patient was given instructions and counseling regarding her condition or for health maintenance advice. Please see specific information pulled into the AVS if appropriate.       Answers for HPI/ROS submitted by the patient on 1/31/2023  Please describe your symptoms.: Anxiety. Blood work follow up  Have you had these symptoms before?: Yes  How long have you been having these symptoms?: Greater than 2 weeks  Please list any medications you are currently taking for this condition.: Lexapro and Tramadol  Please describe any probable cause for these symptoms. : ?  What is the primary reason for your visit?: Other

## 2023-06-22 PROBLEM — R42 VERTIGO: Status: ACTIVE | Noted: 2023-06-22

## 2023-08-07 ENCOUNTER — OFFICE VISIT (OUTPATIENT)
Dept: CARDIOLOGY | Facility: CLINIC | Age: 63
End: 2023-08-07
Payer: COMMERCIAL

## 2023-08-07 VITALS
HEIGHT: 61 IN | SYSTOLIC BLOOD PRESSURE: 140 MMHG | BODY MASS INDEX: 23.6 KG/M2 | DIASTOLIC BLOOD PRESSURE: 76 MMHG | WEIGHT: 125 LBS | HEART RATE: 70 BPM

## 2023-08-07 DIAGNOSIS — I10 ESSENTIAL HYPERTENSION: ICD-10-CM

## 2023-08-07 DIAGNOSIS — R00.2 PALPITATIONS: Primary | ICD-10-CM

## 2023-08-07 PROCEDURE — 99213 OFFICE O/P EST LOW 20 MIN: CPT | Performed by: INTERNAL MEDICINE

## 2023-08-07 PROCEDURE — 93000 ELECTROCARDIOGRAM COMPLETE: CPT | Performed by: INTERNAL MEDICINE

## 2023-08-07 NOTE — PROGRESS NOTES
Subjective:     Encounter Date: 08/07/23        Patient ID: Micheline Fournier is a 63 y.o. female.    Chief Complaint: HTN  Hypertension      I had the pleasure of seeing this patient in the office today for follow-up of her cardiac status.  As you know she has a history of hypertension as well as palpitations.    She denies any chest pain, pressure, tightness, squeezing, or heartburn.  She has not experienced any feeling of palpitations, tachycardia or heart racing and no presyncope or syncope.  There has not been any problems with dizziness or lightheadedness.  There has not been any orthopnea or PND, and no problems with lower extremity edema.  She denies any shortness of breath at rest or with activity and has not had any wheezing.  She has continued to perform daily activities of living without any specific problem or change in the level of activity.    This patient has no known cardiac history.  This patient has no history of coronary artery disease, congestive heart failure, rheumatic fever, rheumatic heart disease, congenital heart disease or heart murmur.  This patient has never required invasive cardiovascular evaluation.    The following portions of the patient's history were reviewed and updated as appropriate: allergies, current medications, past family history, past medical history, past social history, past surgical history and problem list.    Past Medical History:   Diagnosis Date    Anxiety 2022    Arrhythmia     Colon polyp 1999    Hypertension     Palpitations     Stroke 2001       Past Surgical History:   Procedure Laterality Date    COLONOSCOPY  2020       Social History     Socioeconomic History    Marital status:     Number of children: 4   Tobacco Use    Smoking status: Never    Smokeless tobacco: Never   Vaping Use    Vaping Use: Never used   Substance and Sexual Activity    Alcohol use: Yes     Comment: Occasionally    Drug use: Never     Comment: caffine use    Sexual activity:  "Not Currently     Partners: Male     Birth control/protection: Surgical     Comment: Ablation           ECG 12 Lead    Date/Time: 8/7/2023 11:34 AM  Performed by: Tomy Jackson III, MD  Authorized by: Tomy Jackson III, MD   Comparison: compared with previous ECG   Similar to previous ECG  Rhythm: sinus rhythm  Rate: normal  Conduction: conduction normal  ST Segments: ST segments normal  T Waves: T waves normal  QRS axis: normal  Other: no other findings    Clinical impression: normal ECG           Objective:     Vitals:    08/07/23 1122   BP: 140/76   BP Location: Left arm   Patient Position: Sitting   Pulse: 70   Weight: 56.7 kg (125 lb)   Height: 154.9 cm (60.98\")         Physical Exam  Constitutional:       General: She is not in acute distress.     Appearance: She is well-developed. She is not diaphoretic.   HENT:      Head: Normocephalic and atraumatic.      Nose: Nose normal.   Eyes:      General:         Right eye: No discharge.         Left eye: No discharge.      Conjunctiva/sclera: Conjunctivae normal.      Pupils: Pupils are equal, round, and reactive to light.   Neck:      Thyroid: No thyromegaly.      Trachea: No tracheal deviation.   Cardiovascular:      Rate and Rhythm: Normal rate and regular rhythm.      Pulses: Normal pulses.      Heart sounds: Normal heart sounds, S1 normal and S2 normal.     No S3 sounds.   Pulmonary:      Effort: Pulmonary effort is normal. No respiratory distress.      Breath sounds: Normal breath sounds. No stridor.   Chest:      Chest wall: No tenderness.   Abdominal:      General: Bowel sounds are normal. There is no distension.      Palpations: Abdomen is soft. There is no mass.      Tenderness: There is no abdominal tenderness. There is no guarding or rebound.   Musculoskeletal:         General: No tenderness or deformity. Normal range of motion.      Cervical back: Normal range of motion and neck supple.   Lymphadenopathy:      Cervical: No cervical adenopathy. "   Skin:     General: Skin is warm and dry.      Findings: No erythema or rash.   Neurological:      Mental Status: She is alert and oriented to person, place, and time.      Deep Tendon Reflexes: Reflexes are normal and symmetric.   Psychiatric:         Thought Content: Thought content normal.       Lab Review:           Lab Results   Component Value Date    GLUCOSE 84 02/07/2023    BUN 18 02/07/2023    CREATININE 0.92 02/07/2023    EGFRIFAFRI 70 06/24/2020    BCR 19.6 02/07/2023    K 3.9 02/07/2023    CO2 28.0 02/07/2023    CALCIUM 10.1 02/07/2023    PROTENTOTREF 6.5 08/08/2022    ALBUMIN 5.1 02/07/2023    LABIL2 2.4 (H) 08/08/2022    AST 25 02/07/2023    ALT 19 02/07/2023           Assessment:          Diagnosis Plan   1. Palpitations  ECG 12 Lead      2. Essential hypertension  ECG 12 Lead             Plan:       1.  Hypertension- Good control, continue current medical therapy with the labetalol and nifedipine, along with the spironolactone  2.  Palpitations-rare palpitations, continue medical therapy with the labetalol, doing well    Thank you very much for allowing us to participate in the care of this pleasant patient.  Please don't hesitate to call if I can be of assistance in any way.      Current Outpatient Medications:     aspirin 81 MG tablet, Take 1 tablet by mouth Daily., Disp: , Rfl:     escitalopram (Lexapro) 10 MG tablet, Take 1 tablet by mouth Daily., Disp: 90 tablet, Rfl: 0    labetalol (NORMODYNE) 200 MG tablet, Take 2 tablets by mouth 2 (Two) Times a Day., Disp: 360 tablet, Rfl: 3    meclizine (ANTIVERT) 25 MG tablet, Take 1 tablet by mouth 3 (Three) Times a Day As Needed for Dizziness., Disp: 30 tablet, Rfl: 0    MULTIPLE VITAMIN PO, Take 1 tablet by mouth Daily., Disp: , Rfl:     NIFEdipine CC (ADALAT CC) 90 MG 24 hr tablet, Take 1 tablet by mouth Daily. as directed, Disp: 90 tablet, Rfl: 3    OMEGA-3 FATTY ACIDS PO, Take 1 tablet by mouth Daily., Disp: , Rfl:     spironolactone (ALDACTONE) 25  MG tablet, Take 1 tablet by mouth once daily, Disp: 90 tablet, Rfl: 0    traZODone (DESYREL) 50 MG tablet, Take 0.5 tablets by mouth Every Night., Disp: 90 tablet, Rfl: 0

## 2023-10-10 DIAGNOSIS — F32.A ANXIETY AND DEPRESSION: ICD-10-CM

## 2023-10-10 DIAGNOSIS — F41.9 ANXIETY AND DEPRESSION: ICD-10-CM

## 2023-10-10 RX ORDER — ESCITALOPRAM OXALATE 5 MG/1
10 TABLET ORAL DAILY
Qty: 90 TABLET | Refills: 0 | OUTPATIENT
Start: 2023-10-10

## 2023-10-10 NOTE — TELEPHONE ENCOUNTER
Rx Refill Note  Requested Prescriptions     Pending Prescriptions Disp Refills    escitalopram (LEXAPRO) 5 MG tablet [Pharmacy Med Name: Escitalopram Oxalate 5 MG Oral Tablet] 90 tablet 0     Sig: Take 2 tablets by mouth once daily      Last office visit with prescribing clinician: 2/7/2023   Last telemedicine visit with prescribing clinician: Visit date not found   Next office visit with prescribing clinician: Visit date not found       {TIP  Please add Last Relevant Lab Date if appropriate: 02/07/23                 Would you like a call back once the refill request has been completed: [] Yes [] No    If the office needs to give you a call back, can they leave a voicemail: [] Yes [] No    Sia Lewis MA  10/10/23, 16:15 EDT

## 2023-10-24 DIAGNOSIS — F32.A ANXIETY AND DEPRESSION: ICD-10-CM

## 2023-10-24 DIAGNOSIS — I10 ESSENTIAL HYPERTENSION: Chronic | ICD-10-CM

## 2023-10-24 DIAGNOSIS — R00.2 PALPITATIONS: Chronic | ICD-10-CM

## 2023-10-24 DIAGNOSIS — F41.9 ANXIETY AND DEPRESSION: ICD-10-CM

## 2023-10-24 NOTE — TELEPHONE ENCOUNTER
Caller: Micheline Fournier ANTIONE    Relationship: Self    Best call back number: [phone]    Requested Prescriptions:   Requested Prescriptions     Pending Prescriptions Disp Refills    escitalopram (Lexapro) 10 MG tablet 90 tablet 0     Sig: Take 1 tablet by mouth Daily.        Pharmacy where request should be sent: [address] [facility] [phone] [facility] [phone]      Last office visit with prescribing clinician: 2/7/2023   Last telemedicine visit with prescribing clinician: Visit date not found   Next office visit with prescribing clinician: Visit date not found     Additional details provided by patient: PATIENT STATED SHE IS STILL TAKING THIS MEDICATION, AND IS ALMOST OUT.     Does the patient have less than a 3 day supply:  [x] Yes  [] No    Would you like a call back once the refill request has been completed: [x] Yes [] No    If the office needs to give you a call back, can they leave a voicemail: [x] Yes [] No    Charu Wheatley Rep   10/24/23 16:07 EDT

## 2023-10-25 RX ORDER — LABETALOL 200 MG/1
400 TABLET, FILM COATED ORAL 2 TIMES DAILY
Qty: 360 TABLET | Refills: 0 | Status: SHIPPED | OUTPATIENT
Start: 2023-10-25

## 2023-11-06 DIAGNOSIS — I10 ESSENTIAL HYPERTENSION: Chronic | ICD-10-CM

## 2023-11-06 DIAGNOSIS — R00.2 PALPITATIONS: Chronic | ICD-10-CM

## 2023-11-06 RX ORDER — NIFEDIPINE 90 MG/1
90 TABLET, FILM COATED, EXTENDED RELEASE ORAL DAILY
Qty: 90 TABLET | Refills: 0 | Status: SHIPPED | OUTPATIENT
Start: 2023-11-06

## 2023-12-10 DIAGNOSIS — R42 VERTIGO: ICD-10-CM

## 2023-12-11 RX ORDER — MECLIZINE HYDROCHLORIDE 25 MG/1
25 TABLET ORAL 3 TIMES DAILY PRN
Qty: 30 TABLET | Refills: 0 | Status: SHIPPED | OUTPATIENT
Start: 2023-12-11

## 2023-12-11 NOTE — TELEPHONE ENCOUNTER
Rx Refill Note  Requested Prescriptions     Pending Prescriptions Disp Refills    meclizine (ANTIVERT) 25 MG tablet [Pharmacy Med Name: Meclizine HCl 25 MG Oral Tablet] 30 tablet 0     Sig: TAKE 1 TABLET BY MOUTH THREE TIMES DAILY AS NEEDED FOR DIZZINESS      Last office visit with prescribing clinician: 6/22/2023   Last telemedicine visit with prescribing clinician: Visit date not found   Next office visit with prescribing clinician: Visit date not found                         Would you like a call back once the refill request has been completed: [] Yes [] No    If the office needs to give you a call back, can they leave a voicemail: [] Yes [] No    Lou Ray MA  12/11/23, 16:14 EST

## 2024-02-15 DIAGNOSIS — I10 ESSENTIAL HYPERTENSION: Chronic | ICD-10-CM

## 2024-02-15 DIAGNOSIS — R00.2 PALPITATIONS: Chronic | ICD-10-CM

## 2024-02-15 RX ORDER — NIFEDIPINE 90 MG/1
90 TABLET, FILM COATED, EXTENDED RELEASE ORAL DAILY
Qty: 90 TABLET | Refills: 0 | Status: SHIPPED | OUTPATIENT
Start: 2024-02-15

## 2024-02-15 RX ORDER — SPIRONOLACTONE 25 MG/1
TABLET ORAL
Qty: 90 TABLET | Refills: 0 | Status: SHIPPED | OUTPATIENT
Start: 2024-02-15

## 2024-02-15 RX ORDER — LABETALOL 200 MG/1
400 TABLET, FILM COATED ORAL 2 TIMES DAILY
Qty: 360 TABLET | Refills: 0 | Status: SHIPPED | OUTPATIENT
Start: 2024-02-15

## 2024-02-27 ENCOUNTER — TELEPHONE (OUTPATIENT)
Dept: CARDIOLOGY | Facility: CLINIC | Age: 64
End: 2024-02-27
Payer: COMMERCIAL

## 2024-02-27 NOTE — TELEPHONE ENCOUNTER
Caller: Micheline Fournier    Relationship: Self    Best call back number: [phone]    What form or medical record are you requesting: PAPERWORK    Who [facility] this [facility]    How would you like to receive the form or medical records (pick-up, mail, fax): FAX  If fax, what is the fax number: [phone]    Timeframe paperwork needed: ASAP    Additional notes: PT IS CALLING TO SEE IF WE CAN FILL OUT PAPERWORK FAXED TO US FROM [facility] SO SHE CAN HAVE LIFE INSURANCE.

## 2024-02-27 NOTE — TELEPHONE ENCOUNTER
Called pt's life insurance policy. I was unable to reach someone, but I did leave a VM with information on where they can get those records from. [facility] medical records [phone]-Fax, [phone].

## 2024-03-27 ENCOUNTER — TELEPHONE (OUTPATIENT)
Dept: FAMILY MEDICINE CLINIC | Facility: CLINIC | Age: 64
End: 2024-03-27
Payer: COMMERCIAL

## 2024-03-27 NOTE — TELEPHONE ENCOUNTER
"FYI:    Called and relayed to pt provider's recommendation.  Pt states \"that's OK, I'll just take my chances with the vaccine\" and get it completed at the travel clinic.  \"I don't think that's necessary.\"  Pt has declined the recommendation of scheduling an appointment for annual and discuss travel vaccinations.  "

## 2024-03-27 NOTE — TELEPHONE ENCOUNTER
Pt stopped by office earlier today stating she is going on a mission trip in August.  She has vaccines scheduled at a traveling clinic for 04/01/24, however she needs a form signed from provider stating she is healthy enough for the yellow fever vaccine (due to her age).    Is this something she is needing to schedule an appointment with you for or can she receive a letter stating that she is?  Pt last saw you on 02/07/23.  Pt is due for ANNUAL.

## 2024-06-22 DIAGNOSIS — R00.2 PALPITATIONS: Chronic | ICD-10-CM

## 2024-06-22 DIAGNOSIS — I10 ESSENTIAL HYPERTENSION: Chronic | ICD-10-CM

## 2024-06-24 ENCOUNTER — OFFICE VISIT (OUTPATIENT)
Dept: FAMILY MEDICINE CLINIC | Facility: CLINIC | Age: 64
End: 2024-06-24
Payer: COMMERCIAL

## 2024-06-24 VITALS
RESPIRATION RATE: 14 BRPM | OXYGEN SATURATION: 95 % | HEART RATE: 75 BPM | SYSTOLIC BLOOD PRESSURE: 132 MMHG | WEIGHT: 129.1 LBS | HEIGHT: 61 IN | TEMPERATURE: 98.7 F | BODY MASS INDEX: 24.37 KG/M2 | DIASTOLIC BLOOD PRESSURE: 80 MMHG

## 2024-06-24 DIAGNOSIS — Z00.00 ROUTINE PHYSICAL EXAMINATION: Primary | ICD-10-CM

## 2024-06-24 DIAGNOSIS — R53.83 OTHER FATIGUE: ICD-10-CM

## 2024-06-24 DIAGNOSIS — D72.818 OTHER DECREASED WHITE BLOOD CELL (WBC) COUNT: ICD-10-CM

## 2024-06-24 DIAGNOSIS — M21.611 BILATERAL BUNIONS: ICD-10-CM

## 2024-06-24 DIAGNOSIS — M21.612 BILATERAL BUNIONS: ICD-10-CM

## 2024-06-24 DIAGNOSIS — Z13.1 SCREENING FOR DIABETES MELLITUS: ICD-10-CM

## 2024-06-24 DIAGNOSIS — F33.1 MODERATE RECURRENT MAJOR DEPRESSION: ICD-10-CM

## 2024-06-24 DIAGNOSIS — F41.9 ANXIETY: ICD-10-CM

## 2024-06-24 PROCEDURE — 99214 OFFICE O/P EST MOD 30 MIN: CPT | Performed by: FAMILY MEDICINE

## 2024-06-24 PROCEDURE — 99396 PREV VISIT EST AGE 40-64: CPT | Performed by: FAMILY MEDICINE

## 2024-06-24 RX ORDER — AZITHROMYCIN 500 MG/1
TABLET, FILM COATED ORAL
COMMUNITY
Start: 2024-04-01 | End: 2024-07-01 | Stop reason: ALTCHOICE

## 2024-06-24 RX ORDER — TRAZODONE HYDROCHLORIDE 50 MG/1
25 TABLET ORAL NIGHTLY
Qty: 90 TABLET | Refills: 0 | Status: SHIPPED | OUTPATIENT
Start: 2024-06-24

## 2024-06-24 RX ORDER — ATOVAQUONE AND PROGUANIL HYDROCHLORIDE 250; 100 MG/1; MG/1
TABLET, FILM COATED ORAL
COMMUNITY
Start: 2024-04-01 | End: 2024-07-01 | Stop reason: ALTCHOICE

## 2024-06-24 RX ORDER — ESCITALOPRAM OXALATE 10 MG/1
10 TABLET ORAL DAILY
Qty: 90 TABLET | Refills: 1 | Status: SHIPPED | OUTPATIENT
Start: 2024-06-24

## 2024-06-24 NOTE — PROGRESS NOTES
"Chief Complaint  Annual Exam    Subjective        Micheline Fournier presents to [facility]  History of Present Illness for annual exam.  Patient also complaining of increased fatigue and increased depression and anxiety.  She was on Lexapro in the past quit taking the Lexapro .  Denies any side effects with the medication.  She also has a history of insomnia taking trazodone denies any SI or HI.    Objective   Vital Signs:  /80   Pulse 75   Temp 98.7 °F (37.1 °C) (Oral)   Resp 14   Ht 154.9 cm (60.98\")   Wt 58.6 kg (129 lb 1.6 oz)   SpO2 95%   BMI 24.41 kg/m²   Estimated body mass index is 24.41 kg/m² as calculated from the following:    Height as of this encounter: 154.9 cm (60.98\").    Weight as of this encounter: 58.6 kg (129 lb 1.6 oz).       BMI is within normal parameters. No other follow-up for BMI required.      Physical Exam   Result Review :                   Assessment and Plan   Diagnoses and all orders for this visit:    1. Routine physical examination (Primary)  -     Hepatitis C Antibody  -     CBC & Differential  -     Comprehensive Metabolic Panel  -     Lipid Panel  -     TSH Rfx On Abnormal To Free T4    2. Screening for diabetes mellitus  -     Hemoglobin A1c    3. Other fatigue  -     Vitamin B12 & Folate    4. Anxiety  -     traZODone (DESYREL) 50 MG tablet; Take 0.5 tablets by mouth Every Night.  Dispense: 90 tablet; Refill: 0    5. Moderate recurrent major depression    6. Bilateral bunions  -     Ambulatory Referral to Podiatry      Micheline Fournier  Is a 64-year-old female patient seen today for  Routine physical, preventive care discussed with patient.  PHQ screening done in the office today scored high on both for anxiety and depression.  I will restart her on Lexapro.  She is also complaining of fatigue we will do baseline labs including CBC CMP vitamin B12 and folic acid.  Will also check TSH  She is due for mammogram will schedule appointment " with her GYN.  Continue vitamin D and calcium.  Follow-up as needed or in 4 weeks       Follow Up   There are no Patient Instructions on file for this visit.   Return in about 4 weeks (around 7/22/2024) for Recheck.  Patient was given instructions and counseling regarding her condition or for health maintenance advice. Please see specific information pulled into the AVS if appropriate.

## 2024-06-25 LAB
ALBUMIN SERPL-MCNC: 5.1 G/DL (ref 3.5–5.2)
ALBUMIN/GLOB SERPL: 2.2 G/DL
ALP SERPL-CCNC: 67 U/L (ref 39–117)
ALT SERPL-CCNC: 29 U/L (ref 1–33)
AST SERPL-CCNC: 32 U/L (ref 1–32)
BASOPHILS # BLD AUTO: 0.04 10*3/MM3 (ref 0–0.2)
BASOPHILS NFR BLD AUTO: 1.4 % (ref 0–1.5)
BILIRUB SERPL-MCNC: 0.9 MG/DL (ref 0–1.2)
BUN SERPL-MCNC: 15 MG/DL (ref 8–23)
BUN/CREAT SERPL: 16.9 (ref 7–25)
CALCIUM SERPL-MCNC: 10.1 MG/DL (ref 8.6–10.5)
CHLORIDE SERPL-SCNC: 102 MMOL/L (ref 98–107)
CHOLEST SERPL-MCNC: 261 MG/DL (ref 0–200)
CO2 SERPL-SCNC: 25.7 MMOL/L (ref 22–29)
CREAT SERPL-MCNC: 0.89 MG/DL (ref 0.57–1)
EGFRCR SERPLBLD CKD-EPI 2021: 72.5 ML/MIN/1.73
EOSINOPHIL # BLD AUTO: 0.03 10*3/MM3 (ref 0–0.4)
EOSINOPHIL NFR BLD AUTO: 1 % (ref 0.3–6.2)
ERYTHROCYTE [DISTWIDTH] IN BLOOD BY AUTOMATED COUNT: 12.6 % (ref 12.3–15.4)
FOLATE SERPL-MCNC: 10.5 NG/ML (ref 4.78–24.2)
GLOBULIN SER CALC-MCNC: 2.3 GM/DL
GLUCOSE SERPL-MCNC: 99 MG/DL (ref 65–99)
HBA1C MFR BLD: 5.5 % (ref 4.8–5.6)
HCT VFR BLD AUTO: 43 % (ref 34–46.6)
HCV IGG SERPL QL IA: NON REACTIVE
HDLC SERPL-MCNC: 92 MG/DL (ref 40–60)
HGB BLD-MCNC: 14.3 G/DL (ref 12–15.9)
IMM GRANULOCYTES # BLD AUTO: 0.01 10*3/MM3 (ref 0–0.05)
IMM GRANULOCYTES NFR BLD AUTO: 0.3 % (ref 0–0.5)
LDLC SERPL CALC-MCNC: 161 MG/DL (ref 0–100)
LYMPHOCYTES # BLD AUTO: 0.99 10*3/MM3 (ref 0.7–3.1)
LYMPHOCYTES NFR BLD AUTO: 33.6 % (ref 19.6–45.3)
MCH RBC QN AUTO: 29.8 PG (ref 26.6–33)
MCHC RBC AUTO-ENTMCNC: 33.3 G/DL (ref 31.5–35.7)
MCV RBC AUTO: 89.6 FL (ref 79–97)
MONOCYTES # BLD AUTO: 0.31 10*3/MM3 (ref 0.1–0.9)
MONOCYTES NFR BLD AUTO: 10.5 % (ref 5–12)
NEUTROPHILS # BLD AUTO: 1.57 10*3/MM3 (ref 1.7–7)
NEUTROPHILS NFR BLD AUTO: 53.2 % (ref 42.7–76)
NRBC BLD AUTO-RTO: 0 /100 WBC (ref 0–0.2)
PLATELET # BLD AUTO: 284 10*3/MM3 (ref 140–450)
POTASSIUM SERPL-SCNC: 4.1 MMOL/L (ref 3.5–5.2)
PROT SERPL-MCNC: 7.4 G/DL (ref 6–8.5)
RBC # BLD AUTO: 4.8 10*6/MM3 (ref 3.77–5.28)
SODIUM SERPL-SCNC: 141 MMOL/L (ref 136–145)
TRIGL SERPL-MCNC: 53 MG/DL (ref 0–150)
TSH SERPL DL<=0.005 MIU/L-ACNC: 1.32 UIU/ML (ref 0.27–4.2)
VIT B12 SERPL-MCNC: 418 PG/ML (ref 211–946)
VLDLC SERPL CALC-MCNC: 8 MG/DL (ref 5–40)
WBC # BLD AUTO: 2.95 10*3/MM3 (ref 3.4–10.8)

## 2024-06-26 RX ORDER — NIFEDIPINE 90 MG/1
90 TABLET, FILM COATED, EXTENDED RELEASE ORAL DAILY
Qty: 90 TABLET | Refills: 0 | Status: SHIPPED | OUTPATIENT
Start: 2024-06-26

## 2024-06-26 RX ORDER — LABETALOL 200 MG/1
400 TABLET, FILM COATED ORAL 2 TIMES DAILY
Qty: 360 TABLET | Refills: 0 | Status: SHIPPED | OUTPATIENT
Start: 2024-06-26

## 2024-06-26 RX ORDER — SPIRONOLACTONE 25 MG/1
TABLET ORAL
Qty: 90 TABLET | Refills: 0 | Status: SHIPPED | OUTPATIENT
Start: 2024-06-26

## 2024-07-01 ENCOUNTER — OFFICE VISIT (OUTPATIENT)
Dept: CARDIOLOGY | Facility: CLINIC | Age: 64
End: 2024-07-01
Payer: COMMERCIAL

## 2024-07-01 VITALS
HEIGHT: 60 IN | OXYGEN SATURATION: 97 % | HEART RATE: 70 BPM | RESPIRATION RATE: 16 BRPM | BODY MASS INDEX: 25.72 KG/M2 | WEIGHT: 131 LBS | SYSTOLIC BLOOD PRESSURE: 126 MMHG | DIASTOLIC BLOOD PRESSURE: 80 MMHG

## 2024-07-01 DIAGNOSIS — R00.2 PALPITATIONS: Chronic | ICD-10-CM

## 2024-07-01 DIAGNOSIS — I10 PRIMARY HYPERTENSION: Primary | ICD-10-CM

## 2024-07-01 PROCEDURE — 93000 ELECTROCARDIOGRAM COMPLETE: CPT | Performed by: INTERNAL MEDICINE

## 2024-07-01 PROCEDURE — 99214 OFFICE O/P EST MOD 30 MIN: CPT | Performed by: INTERNAL MEDICINE

## 2024-07-01 NOTE — PROGRESS NOTES
Subjective:     Encounter Date: 07/01/24        Patient ID: Micheline Fournier is a 64 y.o. female.    Chief Complaint: HTN  Hypertension      Dear Dr. Sparrow,    I had the pleasure of seeing this patient in the office today for follow-up of her cardiac status.  As you know she has a history of hypertension as well as palpitations.    She has been doing well since her last visit without any particular complaints.  No complaints of chest pain or chest discomfort, no shortness of breath.  She has noted increasing fatigue.  Been having some issues with anxiety as well as generalized fatigue and was just in your office to be seen for this.  No dizziness lightheadedness, no presyncope or syncope.  She has been having some issues with vertigo, notes this both when she is upright as well as when she is lying down.  Denies orthopnea or PND.  No lower extremity edema.    This patient has no known cardiac history.  This patient has no history of coronary artery disease, congestive heart failure, rheumatic fever, rheumatic heart disease, congenital heart disease or heart murmur.  This patient has never required invasive cardiovascular evaluation.    The following portions of the patient's history were reviewed and updated as appropriate: allergies, current medications, past family history, past medical history, past social history, past surgical history and problem list.    Past Medical History:   Diagnosis Date    Anxiety 2022    Arrhythmia     Arthritis     Colon polyp 1999    Hypertension     Palpitations     Stroke 2001       Past Surgical History:   Procedure Laterality Date    COLONOSCOPY  2020             ECG 12 Lead    Date/Time: 7/1/2024 1:48 PM  Performed by: Tomy Jackson III, MD    Authorized by: Tomy Jackson III, MD  Comparison: compared with previous ECG   Similar to previous ECG  Rhythm: sinus rhythm  Rate: normal  Conduction: conduction normal  ST Segments: ST segments normal  T Waves: T waves normal  QRS  "axis: normal  Other: no other findings    Clinical impression: normal ECG             Objective:     Vitals:    07/01/24 1322   BP: 126/80   BP Location: Right arm   Patient Position: Sitting   Cuff Size: Adult   Pulse: 70   Resp: 16   SpO2: 97%   Weight: 59.4 kg (131 lb)   Height: 152.4 cm (60\")         Physical Exam  Constitutional:       General: She is not in acute distress.     Appearance: She is well-developed. She is not diaphoretic.   HENT:      Head: Normocephalic and atraumatic.      Nose: Nose normal.   Eyes:      General:         Right eye: No discharge.         Left eye: No discharge.      Conjunctiva/sclera: Conjunctivae normal.      Pupils: Pupils are equal, round, and reactive to light.   Neck:      Thyroid: No thyromegaly.      Trachea: No tracheal deviation.   Cardiovascular:      Rate and Rhythm: Normal rate and regular rhythm.      Pulses: Normal pulses.      Heart sounds: Normal heart sounds, S1 normal and S2 normal.      No S3 sounds.   Pulmonary:      Effort: Pulmonary effort is normal. No respiratory distress.      Breath sounds: Normal breath sounds. No stridor.   Chest:      Chest wall: No tenderness.   Abdominal:      General: Bowel sounds are normal. There is no distension.      Palpations: Abdomen is soft. There is no mass.      Tenderness: There is no abdominal tenderness. There is no guarding or rebound.   Musculoskeletal:         General: No tenderness or deformity. Normal range of motion.      Cervical back: Normal range of motion and neck supple.   Lymphadenopathy:      Cervical: No cervical adenopathy.   Skin:     General: Skin is warm and dry.      Findings: No erythema or rash.   Neurological:      Mental Status: She is alert and oriented to person, place, and time.      Deep Tendon Reflexes: Reflexes are normal and symmetric.   Psychiatric:         Thought Content: Thought content normal.         Lab Review:           Lab Results   Component Value Date    GLUCOSE 99 06/24/2024 "    BUN 15 06/24/2024    CREATININE 0.89 06/24/2024    EGFRIFAFRI 70 06/24/2020    BCR 16.9 06/24/2024    K 4.1 06/24/2024    CO2 25.7 06/24/2024    CALCIUM 10.1 06/24/2024    PROTENTOTREF 7.4 06/24/2024    ALBUMIN 5.1 06/24/2024    LABIL2 2.2 06/24/2024    AST 32 06/24/2024    ALT 29 06/24/2024           Assessment:          Diagnosis Plan   1. Primary hypertension  ECG 12 Lead      2. Palpitations  ECG 12 Lead               Plan:       1.  Hypertension- Good control, continue current medical therapy with the labetalol and nifedipine, along with the spironolactone  2.  Palpitations-rare palpitations, continue medical therapy with the labetalol, doing well    Thank you very much for allowing us to participate in the care of this pleasant patient.  Please don't hesitate to call if I can be of assistance in any way.      Current Outpatient Medications:     aspirin 81 MG tablet, Take 1 tablet by mouth Daily., Disp: , Rfl:     escitalopram (Lexapro) 10 MG tablet, Take 1 tablet by mouth Daily., Disp: 90 tablet, Rfl: 1    labetalol (NORMODYNE) 200 MG tablet, Take 2 tablets by mouth twice daily, Disp: 360 tablet, Rfl: 0    meclizine (ANTIVERT) 25 MG tablet, TAKE 1 TABLET BY MOUTH THREE TIMES DAILY AS NEEDED FOR DIZZINESS, Disp: 30 tablet, Rfl: 0    MULTIPLE VITAMIN PO, Take 1 tablet by mouth Daily., Disp: , Rfl:     NIFEdipine CC (ADALAT CC) 90 MG 24 hr tablet, TAKE 1 TABLET BY MOUTH ONCE DAILY AS DIRECTED, Disp: 90 tablet, Rfl: 0    OMEGA-3 FATTY ACIDS PO, Take 1 tablet by mouth Daily., Disp: , Rfl:     spironolactone (ALDACTONE) 25 MG tablet, Take 1 tablet by mouth once daily, Disp: 90 tablet, Rfl: 0    traZODone (DESYREL) 50 MG tablet, Take 0.5 tablets by mouth Every Night., Disp: 90 tablet, Rfl: 0

## 2024-09-10 ENCOUNTER — OFFICE VISIT (OUTPATIENT)
Dept: FAMILY MEDICINE CLINIC | Facility: CLINIC | Age: 64
End: 2024-09-10
Payer: COMMERCIAL

## 2024-09-10 VITALS
HEART RATE: 75 BPM | SYSTOLIC BLOOD PRESSURE: 140 MMHG | WEIGHT: 127.6 LBS | TEMPERATURE: 98.9 F | DIASTOLIC BLOOD PRESSURE: 70 MMHG | RESPIRATION RATE: 16 BRPM | HEIGHT: 60 IN | BODY MASS INDEX: 25.05 KG/M2 | OXYGEN SATURATION: 99 %

## 2024-09-10 DIAGNOSIS — D72.819 LEUKOPENIA, UNSPECIFIED TYPE: ICD-10-CM

## 2024-09-10 DIAGNOSIS — Z13.1 SCREENING FOR DIABETES MELLITUS: ICD-10-CM

## 2024-09-10 DIAGNOSIS — M21.612 BILATERAL BUNIONS: ICD-10-CM

## 2024-09-10 DIAGNOSIS — H65.93 OME (OTITIS MEDIA WITH EFFUSION), BILATERAL: ICD-10-CM

## 2024-09-10 DIAGNOSIS — E78.2 MIXED HYPERLIPIDEMIA: Primary | ICD-10-CM

## 2024-09-10 DIAGNOSIS — R53.83 OTHER FATIGUE: ICD-10-CM

## 2024-09-10 DIAGNOSIS — M21.611 BILATERAL BUNIONS: ICD-10-CM

## 2024-09-10 PROCEDURE — 99214 OFFICE O/P EST MOD 30 MIN: CPT | Performed by: FAMILY MEDICINE

## 2024-09-10 RX ORDER — MOMETASONE FUROATE MONOHYDRATE 50 UG/1
2 SPRAY, METERED NASAL DAILY
Qty: 17 G | Refills: 1 | Status: SHIPPED | OUTPATIENT
Start: 2024-09-10

## 2024-09-10 RX ORDER — AMOXICILLIN 500 MG/1
500 CAPSULE ORAL 2 TIMES DAILY
Qty: 14 CAPSULE | Refills: 0 | Status: SHIPPED | OUTPATIENT
Start: 2024-09-10

## 2024-10-14 DIAGNOSIS — R00.2 PALPITATIONS: Chronic | ICD-10-CM

## 2024-10-14 DIAGNOSIS — I10 ESSENTIAL HYPERTENSION: Chronic | ICD-10-CM

## 2024-10-15 RX ORDER — NIFEDIPINE 90 MG/1
90 TABLET, FILM COATED, EXTENDED RELEASE ORAL DAILY
Qty: 90 TABLET | Refills: 0 | Status: SHIPPED | OUTPATIENT
Start: 2024-10-15

## 2024-10-15 RX ORDER — SPIRONOLACTONE 25 MG/1
TABLET ORAL
Qty: 90 TABLET | Refills: 0 | Status: SHIPPED | OUTPATIENT
Start: 2024-10-15

## 2024-11-22 DIAGNOSIS — I10 ESSENTIAL HYPERTENSION: Chronic | ICD-10-CM

## 2024-11-22 DIAGNOSIS — R00.2 PALPITATIONS: Chronic | ICD-10-CM

## 2024-11-22 RX ORDER — LABETALOL 200 MG/1
400 TABLET, FILM COATED ORAL 2 TIMES DAILY
Qty: 360 TABLET | Refills: 0 | Status: SHIPPED | OUTPATIENT
Start: 2024-11-22

## 2024-11-22 RX ORDER — NIFEDIPINE 90 MG/1
90 TABLET, FILM COATED, EXTENDED RELEASE ORAL DAILY
Qty: 90 TABLET | Refills: 0 | Status: SHIPPED | OUTPATIENT
Start: 2024-11-22

## 2024-11-22 RX ORDER — SPIRONOLACTONE 25 MG/1
TABLET ORAL
Qty: 90 TABLET | Refills: 0 | Status: SHIPPED | OUTPATIENT
Start: 2024-11-22

## 2024-12-14 DIAGNOSIS — F41.9 ANXIETY AND DEPRESSION: ICD-10-CM

## 2024-12-14 DIAGNOSIS — F32.A ANXIETY AND DEPRESSION: ICD-10-CM

## 2024-12-14 DIAGNOSIS — F41.9 ANXIETY: ICD-10-CM

## 2024-12-16 RX ORDER — ESCITALOPRAM OXALATE 10 MG/1
10 TABLET ORAL DAILY
Qty: 90 TABLET | Refills: 1 | Status: SHIPPED | OUTPATIENT
Start: 2024-12-16

## 2024-12-16 RX ORDER — TRAZODONE HYDROCHLORIDE 50 MG/1
25 TABLET, FILM COATED ORAL NIGHTLY
Qty: 45 TABLET | Refills: 1 | Status: SHIPPED | OUTPATIENT
Start: 2024-12-16

## 2025-02-19 ENCOUNTER — TELEPHONE (OUTPATIENT)
Dept: FAMILY MEDICINE CLINIC | Facility: CLINIC | Age: 65
End: 2025-02-19
Payer: COMMERCIAL

## 2025-02-19 DIAGNOSIS — Z13.1 SCREENING FOR DIABETES MELLITUS: ICD-10-CM

## 2025-02-19 DIAGNOSIS — E78.2 MIXED HYPERLIPIDEMIA: Primary | ICD-10-CM

## 2025-02-19 DIAGNOSIS — R53.83 OTHER FATIGUE: ICD-10-CM

## 2025-02-19 NOTE — TELEPHONE ENCOUNTER
Caller: Micheline Fournier    Relationship to patient: Self    Best call back number: [phone]     Chief complaint: LAB APPOINTMENT    Type of visit: LABS    Requested date: BEFORE 3/10/25      Additional notes PLEASE CALL PATIENT TO SCHEDULE LAB APPOINTMENT BEFORE 3/10/25 VISIT.

## 2025-02-27 DIAGNOSIS — E78.2 MIXED HYPERLIPIDEMIA: ICD-10-CM

## 2025-02-27 DIAGNOSIS — R53.83 OTHER FATIGUE: ICD-10-CM

## 2025-02-27 DIAGNOSIS — R00.2 PALPITATIONS: Chronic | ICD-10-CM

## 2025-02-27 DIAGNOSIS — Z13.1 SCREENING FOR DIABETES MELLITUS: ICD-10-CM

## 2025-02-27 DIAGNOSIS — I10 ESSENTIAL HYPERTENSION: Chronic | ICD-10-CM

## 2025-02-27 RX ORDER — LABETALOL 200 MG/1
400 TABLET, FILM COATED ORAL 2 TIMES DAILY
Qty: 360 TABLET | Refills: 1 | Status: SHIPPED | OUTPATIENT
Start: 2025-02-27

## 2025-03-04 LAB
ALBUMIN SERPL-MCNC: 4.9 G/DL (ref 3.9–4.9)
ALP SERPL-CCNC: 76 IU/L (ref 44–121)
ALT SERPL-CCNC: 14 IU/L (ref 0–32)
AST SERPL-CCNC: 20 IU/L (ref 0–40)
BASOPHILS # BLD AUTO: 0 X10E3/UL (ref 0–0.2)
BASOPHILS NFR BLD AUTO: 1 %
BILIRUB SERPL-MCNC: 0.8 MG/DL (ref 0–1.2)
BUN SERPL-MCNC: 10 MG/DL (ref 8–27)
BUN/CREAT SERPL: 10 (ref 12–28)
CALCIUM SERPL-MCNC: 10.2 MG/DL (ref 8.7–10.3)
CHLORIDE SERPL-SCNC: 105 MMOL/L (ref 96–106)
CHOLEST SERPL-MCNC: 264 MG/DL (ref 100–199)
CO2 SERPL-SCNC: 23 MMOL/L (ref 20–29)
CREAT SERPL-MCNC: 0.96 MG/DL (ref 0.57–1)
EGFRCR SERPLBLD CKD-EPI 2021: 66 ML/MIN/1.73
EOSINOPHIL # BLD AUTO: 0 X10E3/UL (ref 0–0.4)
EOSINOPHIL NFR BLD AUTO: 1 %
ERYTHROCYTE [DISTWIDTH] IN BLOOD BY AUTOMATED COUNT: 14.6 % (ref 11.7–15.4)
GLOBULIN SER CALC-MCNC: 2.2 G/DL (ref 1.5–4.5)
GLUCOSE SERPL-MCNC: 80 MG/DL (ref 70–99)
HBA1C MFR BLD: 5.6 % (ref 4.8–5.6)
HCT VFR BLD AUTO: 40 % (ref 34–46.6)
HDLC SERPL-MCNC: 79 MG/DL
HGB BLD-MCNC: 13 G/DL (ref 11.1–15.9)
IMM GRANULOCYTES # BLD AUTO: 0 X10E3/UL (ref 0–0.1)
IMM GRANULOCYTES NFR BLD AUTO: 0 %
LDLC SERPL CALC-MCNC: 173 MG/DL (ref 0–99)
LYMPHOCYTES # BLD AUTO: 1.5 X10E3/UL (ref 0.7–3.1)
LYMPHOCYTES NFR BLD AUTO: 44 %
MCH RBC QN AUTO: 28.6 PG (ref 26.6–33)
MCHC RBC AUTO-ENTMCNC: 32.5 G/DL (ref 31.5–35.7)
MCV RBC AUTO: 88 FL (ref 79–97)
MONOCYTES # BLD AUTO: 0.4 X10E3/UL (ref 0.1–0.9)
MONOCYTES NFR BLD AUTO: 12 %
NEUTROPHILS # BLD AUTO: 1.5 X10E3/UL (ref 1.4–7)
NEUTROPHILS NFR BLD AUTO: 42 %
PLATELET # BLD AUTO: 251 X10E3/UL (ref 150–450)
POTASSIUM SERPL-SCNC: 3.8 MMOL/L (ref 3.5–5.2)
PROT SERPL-MCNC: 7.1 G/DL (ref 6–8.5)
RBC # BLD AUTO: 4.55 X10E6/UL (ref 3.77–5.28)
SODIUM SERPL-SCNC: 145 MMOL/L (ref 134–144)
TRIGL SERPL-MCNC: 72 MG/DL (ref 0–149)
TSH SERPL DL<=0.005 MIU/L-ACNC: 1.77 UIU/ML (ref 0.45–4.5)
VLDLC SERPL CALC-MCNC: 12 MG/DL (ref 5–40)
WBC # BLD AUTO: 3.5 X10E3/UL (ref 3.4–10.8)

## 2025-03-10 ENCOUNTER — OFFICE VISIT (OUTPATIENT)
Dept: FAMILY MEDICINE CLINIC | Facility: CLINIC | Age: 65
End: 2025-03-10
Payer: COMMERCIAL

## 2025-03-10 VITALS
HEART RATE: 71 BPM | DIASTOLIC BLOOD PRESSURE: 64 MMHG | SYSTOLIC BLOOD PRESSURE: 122 MMHG | BODY MASS INDEX: 24.01 KG/M2 | WEIGHT: 122.3 LBS | OXYGEN SATURATION: 98 % | HEIGHT: 60 IN

## 2025-03-10 DIAGNOSIS — E78.2 MIXED HYPERLIPIDEMIA: Primary | ICD-10-CM

## 2025-03-10 DIAGNOSIS — I10 PRIMARY HYPERTENSION: ICD-10-CM

## 2025-03-10 DIAGNOSIS — M25.50 ARTHRALGIA OF MULTIPLE JOINTS: ICD-10-CM

## 2025-03-10 DIAGNOSIS — M75.80 ROTATOR CUFF TENDINITIS, UNSPECIFIED LATERALITY: ICD-10-CM

## 2025-03-10 DIAGNOSIS — R63.0 DECREASED APPETITE: ICD-10-CM

## 2025-03-10 RX ORDER — ATORVASTATIN CALCIUM 10 MG/1
10 TABLET, FILM COATED ORAL DAILY
Qty: 90 TABLET | Refills: 0 | Status: SHIPPED | OUTPATIENT
Start: 2025-03-10

## 2025-03-10 RX ORDER — METHYLPREDNISOLONE 4 MG/1
TABLET ORAL
Qty: 1 EACH | Refills: 0 | Status: SHIPPED | OUTPATIENT
Start: 2025-03-10 | End: 2025-03-10

## 2025-03-10 RX ORDER — METHYLPREDNISOLONE 4 MG/1
TABLET ORAL
Qty: 1 EACH | Refills: 0 | Status: SHIPPED | OUTPATIENT
Start: 2025-03-10

## 2025-03-10 NOTE — PROGRESS NOTES
Chief Complaint  Follow-up (6 mo)    Subjective        Micheline Fournier presents to [facility]  History of Present Illness    History of Present Illness  The patient is a 65-year-old female who presents for follow-up on her cholesterol, blood pressure, and autoimmune disorder.    She reports persistent pain in her left shoulder, which has been ongoing for several months. The pain is severe enough to interfere with her daily activities, including bathing. She is able to lift her arm but experiences difficulty in touching it or moving it backward. Additionally, she reports generalized body pain extending from her shoulders to her knees, accompanied by fatigue. She suspects these symptoms may be related to a flare-up of her autoimmune condition. Her sleep is disrupted due to the pain, which affects all her joints, including her fingers, knees, and hips. She has a history of taking prednisone but does not recall any side effects. She consulted a rheumatologist approximately 25 years ago.    She has been experiencing intermittent loss of appetite and occasional depression, which she manages with Lexapro and trazodone 25 mg daily. She experienced a period of about a month where she had difficulty eating and drinking, often feeling nauseous when forcing herself to consume food and beverages. She attributes these symptoms to her autoimmune disorder. She has not consulted a gastroenterologist. She has lost 5 pounds since her last visit in September 2024. She has not been diagnosed with rheumatoid arthritis, although she has received conflicting opinions from different physicians regarding the possibility of rheumatoid arthritis and lupus. She has not followed up with a rheumatologist.    She is currently on labetalol, nifedipine, and spironolactone for blood pressure management.    She is scheduled for a colonoscopy in either September or October 2025.    Supplemental Information  She saw the  "podiatrist and had surgery on 01/27/2025.    SOCIAL HISTORY  She does not smoke.    FAMILY HISTORY  Her sister had a heart attack and was a smoker. She is not aware of any family history of heart disease.    MEDICATIONS  Current: Labetalol, nifedipine, spironolactone, Lexapro, trazodone, vitamin D, calcium supplement     Objective   Vital Signs:  /64 (BP Location: Right arm, Patient Position: Sitting, Cuff Size: Adult)   Pulse 71   Ht 152.4 cm (60\")   Wt 55.5 kg (122 lb 4.8 oz)   SpO2 98%   BMI 23.89 kg/m²   Estimated body mass index is 23.89 kg/m² as calculated from the following:    Height as of this encounter: 152.4 cm (60\").    Weight as of this encounter: 55.5 kg (122 lb 4.8 oz).       BMI is within normal parameters. No other follow-up for BMI required.      Physical Exam  Constitutional:       General: She is not in acute distress.     Appearance: Normal appearance. She is well-developed.   HENT:      Head: Normocephalic and atraumatic.      Right Ear: Tympanic membrane normal.      Left Ear: Tympanic membrane normal.      Mouth/Throat:      Mouth: Mucous membranes are moist.   Eyes:      General:         Right eye: No discharge.         Left eye: No discharge.      Extraocular Movements: Extraocular movements intact.      Pupils: Pupils are equal, round, and reactive to light.   Cardiovascular:      Rate and Rhythm: Normal rate and regular rhythm.      Pulses: Normal pulses.      Heart sounds: Normal heart sounds.   Pulmonary:      Effort: Pulmonary effort is normal.      Breath sounds: Normal breath sounds. No wheezing or rales.   Abdominal:      General: Bowel sounds are normal.      Palpations: Abdomen is soft. There is no mass.      Tenderness: There is no abdominal tenderness.   Musculoskeletal:      Cervical back: Normal range of motion and neck supple.      Right lower leg: No edema.      Left lower leg: No edema.   Lymphadenopathy:      Cervical: No cervical adenopathy.   Neurological:     "  General: No focal deficit present.      Mental Status: She is alert and oriented to person, place, and time.        Result Review :                   Assessment and Plan   Diagnoses and all orders for this visit:    1. Mixed hyperlipidemia (Primary)  -     CT Cardiac Calcium Score Without Dye; Future  -     atorvastatin (Lipitor) 10 MG tablet; Take 1 tablet by mouth Daily.  Dispense: 90 tablet; Refill: 0        Assessment & Plan  1. Hyperlipidemia   Her total cholesterol level is elevated at 264 mg/dL, with an LDL level of 173 mg/dL. Given the familial nature of her condition and the absence of modifiable risk factors such as smoking or obesity, pharmacological intervention is warranted. She will be initiated on atorvastatin 10 mg. Potential side effects, including muscle pain, nausea, vomiting, and elevated liver enzymes, have been discussed. She has been advised to discontinue the medication and inform the provider if she experiences unusual fatigue or muscle pain. A coronary artery calcium scoring test will be scheduled to assess the extent of arterial calcification. A follow-up appointment will be scheduled in 3 months to monitor her liver enzymes.    2. Rotator cuff tendinitis.  She presents with persistent pain in her left shoulder, suggestive of rotator cuff tendinitis. A course of prednisone will be initiated to manage the inflammation. A referral for physical therapy will be made to aid in recovery. A rheumatoid panel will be ordered to assess for potential autoimmune causes. She will be started on Medrol Dosepak.    3. ARTHRALGIA ,MULTIPLE JOINTS   She reports constant pain and fatigue, which may be related to her autoimmune condition. She has a history of inconsistent diagnoses, including rheumatoid arthritis and lupus. A rheumatoid panel will be ordered to evaluate her condition further. If the results indicate rheumatoid arthritis, a referral to a rheumatologist will be made.    4. Hypertension.  Her  blood pressure is well-controlled at 120/64 mmHg on her current regimen of labetalol, nifedipine, and spironolactone. No changes to her blood pressure medications are necessary at this time.    5. Weight loss.  She has lost 5 pounds since her last visit in September 2024. This could be attributed to her autoimmune condition, anxiety, or depression. Her weight will be monitored over the next few months. If she continues to lose weight, a CT scan may be considered to rule out other potential causes, including cancer.    6. Health maintenance.  She is up to date with her colonoscopy screenings, with the next one due in September or October of this year. She has been advised to continue taking vitamin D and calcium supplements and to use sunscreen regularly.    Follow-up  The patient will follow up in 4 to 6 weeks.    PROCEDURE  The patient underwent foot surgery on 01/27/2025.          Follow Up   There are no Patient Instructions on file for this visit.   No follow-ups on file.  Patient was given instructions and counseling regarding her condition or for health maintenance advice. Please see specific information pulled into the AVS if appropriate.     Patient or patient representative verbalized consent for the use of Ambient Listening during the visit with  Funmilayo Sparrow MD for chart documentation. 3/23/2025  23:49 EDT

## 2025-03-22 DIAGNOSIS — R00.2 PALPITATIONS: Chronic | ICD-10-CM

## 2025-03-22 DIAGNOSIS — I10 ESSENTIAL HYPERTENSION: Chronic | ICD-10-CM

## 2025-03-24 RX ORDER — NIFEDIPINE 90 MG/1
90 TABLET, FILM COATED, EXTENDED RELEASE ORAL DAILY
Qty: 90 TABLET | Refills: 0 | Status: SHIPPED | OUTPATIENT
Start: 2025-03-24

## 2025-03-24 RX ORDER — SPIRONOLACTONE 25 MG/1
25 TABLET ORAL DAILY
Qty: 90 TABLET | Refills: 0 | Status: SHIPPED | OUTPATIENT
Start: 2025-03-24

## 2025-03-25 ENCOUNTER — TREATMENT (OUTPATIENT)
Dept: PHYSICAL THERAPY | Facility: CLINIC | Age: 65
End: 2025-03-25
Payer: COMMERCIAL

## 2025-03-25 DIAGNOSIS — M75.82 ROTATOR CUFF TENDONITIS, LEFT: Primary | ICD-10-CM

## 2025-03-25 DIAGNOSIS — R29.898 WEAKNESS OF LEFT SHOULDER: ICD-10-CM

## 2025-03-25 DIAGNOSIS — R68.89 ACTIVITY INTOLERANCE: ICD-10-CM

## 2025-03-25 DIAGNOSIS — M25.612 DECREASED SHOULDER MOBILITY, LEFT: ICD-10-CM

## 2025-03-25 PROCEDURE — 97530 THERAPEUTIC ACTIVITIES: CPT | Performed by: PHYSICAL THERAPIST

## 2025-03-25 PROCEDURE — 97110 THERAPEUTIC EXERCISES: CPT | Performed by: PHYSICAL THERAPIST

## 2025-03-25 PROCEDURE — 97112 NEUROMUSCULAR REEDUCATION: CPT | Performed by: PHYSICAL THERAPIST

## 2025-03-25 PROCEDURE — 97161 PT EVAL LOW COMPLEX 20 MIN: CPT | Performed by: PHYSICAL THERAPIST

## 2025-03-25 NOTE — PROGRESS NOTES
Physical Therapy Initial Evaluation and Plan of Care  [facility] [place]   [address], [address]  [place] [address]  P: [phone]  F: [phone]    Patient: Micheline Fournier   : 1960  Diagnosis/ICD-10 Code:  Rotator cuff tendonitis, left [M75.82]  Referring practitioner: Funmilayo Sparrow MD  Date of Initial Visit: 3/25/2025  Today's Date: 3/25/2025  Patient seen for 1 session         Visit Diagnoses:    ICD-10-CM ICD-9-CM   1. Rotator cuff tendonitis, left  M75.82 726.10   2. Weakness of left shoulder  R29.898 781.99   3. Decreased shoulder mobility, left  M25.612 719.51   4. Activity intolerance  R68.89 780.99       PMHx Reviewed : 3/25/2025      Subjective Evaluation    History of Present Illness  Mechanism of injury: Hx:   Pt reports to clinic for IE of L rotator cuff tendonitis. Pt reports she has been dealing w/ this for the last 6 mos. Pt notes she does not recall any event that started this. Pt notes she was on a steroid dose pack   last week which seemed to be helpful. Pt notes the pain seems to be coming back. Pt denies any cervical pain or radicular s/s. Pt denies any type of clicking pain.     PMH includes:  - N/A  -Autoimmune disorder     Pt reported difficulties:  - self reported fxn status =  50/100%  - bathing/dressing: reaching across body, behind back  - OHA (lifting more than 2 pounds)   - sleeping   - impacts IADLs     Hobbies (impacted by dysfxn):  - NA       Patient Occupation: associate at clothing store Quality of life: excellent    Pain  Current pain rating: 3  At best pain rating: 3  At worst pain rating: 10  Quality: sharp and knife-like  Relieving factors: heat and medications  Aggravating factors: overhead activity, prolonged positioning, sleeping, outstretched reach and repetitive movement  Progression: no change    Hand dominance: right    Diagnostic Tests  No diagnostic tests performed    Treatments  Previous treatment:  medication  Patient Goals  Patient goals for therapy: decreased pain, increased motion, increased strength, independence with ADLs/IADLs and return to sport/leisure activities             Objective          Postural Observations  Seated posture: fair  Standing posture: fair  Correction of posture: makes symptoms better      Tenderness     Additional Tenderness Details  No TTP     Active Range of Motion   Left Shoulder   Flexion: 150 (8/10) degrees with pain  Abduction: 160 (8/10) degrees with pain  External rotation 90°: 60 degrees     Right Shoulder   Flexion: WFL  Abduction: WFL  External rotation 90°: WFL    Additional Active Range of Motion Details  BTB IR   R = T4  L = T10, 8/10 pain     Strength/Myotome Testing     Left Shoulder     Planes of Motion   Flexion: 4 (8-9/10)   Abduction: 4   External rotation at 90°: 4+   Internal rotation at 90°: 4+     Right Shoulder     Planes of Motion   Flexion: 5   Abduction: 5   External rotation at 90°: 5   Internal rotation at 90°: 5     Tests     Left Shoulder   Positive empty can.   Negative Yergason's.     Right Shoulder   Negative empty can and Yergason's.           Assessment & Plan       Assessment  Impairments: abnormal coordination, abnormal or restricted ROM, activity intolerance, impaired physical strength, lacks appropriate home exercise program, pain with function and weight-bearing intolerance   Functional limitations: carrying objects, lifting, sleeping, pulling, pushing, uncomfortable because of pain, moving in bed, reaching behind back, reaching overhead and unable to perform repetitive tasks   Assessment details: Pt presents to clinic for IE of L rotator cuff tendonitis. Pt demonstrates decreased L UE ROM, UE weakness, pain, decreased activity tolerance, and positive L empty can test(s).  Pt will benefit from skilled PT services at this time to address found dysfxn/deficits in order to achieve POC goals for pt return to PLOF and improve QoL.   Prognosis:  good    Goals  Plan Goals: STG: (achieve in 4+ weeks)  1. Pt will demo HEP compliance and attendance w/ scheduled therapy visits.   2. Pt will demo pain free 4/5 MMT grades or better of L UE for fxn strength w/ IADLs.   3. Pt will demo improved L UE  ROM by 10 degrees or more for improved fxn mobility w/ IADLs and work duties.   4. Pt will report decreased pain from 101/10 at worst to 7/10 or less on pain scale for QoL and progression of PoC.      LTG: (achieve in 8+ weeks)  1. Pt will report a 20% increase or more in her self reported fxn status to demo increased fxn ability to complete IADLs and work duties.   2. Pt will demo pain free 4+/5 MMT grades or better of LUE for fxn strength w/ IADLs.  3. Pt will reduce Quick DASH score by 10% or more indicating little to no disability of the L UE.   4. Pt will be negative for the L empty can test indicating improvement/resolution of c/c and efficacy of skilled PT interventions.     Plan  Therapy options: will be seen for skilled therapy services  Planned modality interventions: cryotherapy, dry needling, iontophoresis and TENS  Planned therapy interventions: ADL retraining, body mechanics training, fine motor coordination training, flexibility, functional ROM exercises, home exercise program, IADL retraining, joint mobilization, manual therapy, neuromuscular re-education, soft tissue mobilization, spinal/joint mobilization, strengthening, stretching and therapeutic activities  Frequency: 1x week  Duration in weeks: 8  Treatment plan discussed with: patient          Manual Therapy:     0     mins  56814;  Therapeutic Exercise:      15    mins  90047;     Neuromuscular Atilio:       10    mins  00256;    Therapeutic Activity:      10     mins  74179;     Gait Trainin     mins  25183;     Ultrasound:      0     mins  75637;    Electrical Stimulation:     0     mins  49692 ( );  Dry Needling      0     mins self-pay  Traction      0     mins 48631  Heladio  Repositioning    0     mins 23342      Timed Treatment:   35   mins   Total Treatment:     55   mins      PT: Moe Prescott PT     License Number: [id number]  Electronically signed by Moe Prescott PT, 03/25/25, 3:04 PM EDT    Certification Period: 3/25/2025 thru 6/22/2025  I certify that the therapy services are furnished while this patient is under my care.  The services outlined above are required by this patient, and will be reviewed every 90 days.         Physician Signature:__________________________________________________    PHYSICIAN: Funmialyo Sparrow MD  NPI: [id number]                                      DATE:      Please sign in Epic or return via fax to [email or web address] . Thank you, [facility].

## 2025-03-27 ENCOUNTER — HOSPITAL ENCOUNTER (OUTPATIENT)
Dept: CT IMAGING | Facility: HOSPITAL | Age: 65
Discharge: HOME | End: 2025-03-27
Admitting: FAMILY MEDICINE

## 2025-03-27 DIAGNOSIS — E78.2 MIXED HYPERLIPIDEMIA: ICD-10-CM

## 2025-03-27 PROCEDURE — 75571 CT HRT W/O DYE W/CA TEST: CPT

## 2025-04-01 LAB
ANA SER QL: NEGATIVE
BASOPHILS # BLD AUTO: 0.02 10*3/MM3 (ref 0–0.2)
BASOPHILS NFR BLD AUTO: 0.5 % (ref 0–1.5)
CCP IGA+IGG SERPL IA-ACNC: 6 UNITS (ref 0–19)
CK SERPL-CCNC: 259 U/L (ref 20–180)
CRP SERPL-MCNC: <0.3 MG/DL (ref 0–0.5)
EOSINOPHIL # BLD AUTO: 0.06 10*3/MM3 (ref 0–0.4)
EOSINOPHIL NFR BLD AUTO: 1.5 % (ref 0.3–6.2)
ERYTHROCYTE [DISTWIDTH] IN BLOOD BY AUTOMATED COUNT: 14.8 % (ref 12.3–15.4)
ERYTHROCYTE [SEDIMENTATION RATE] IN BLOOD BY WESTERGREN METHOD: 2 MM/HR (ref 0–30)
HCT VFR BLD AUTO: 36.1 % (ref 34–46.6)
HGB BLD-MCNC: 11.4 G/DL (ref 12–15.9)
IMM GRANULOCYTES # BLD AUTO: 0.01 10*3/MM3 (ref 0–0.05)
IMM GRANULOCYTES NFR BLD AUTO: 0.2 % (ref 0–0.5)
LYMPHOCYTES # BLD AUTO: 1.24 10*3/MM3 (ref 0.7–3.1)
LYMPHOCYTES NFR BLD AUTO: 30.9 % (ref 19.6–45.3)
MCH RBC QN AUTO: 28.9 PG (ref 26.6–33)
MCHC RBC AUTO-ENTMCNC: 31.6 G/DL (ref 31.5–35.7)
MCV RBC AUTO: 91.6 FL (ref 79–97)
MONOCYTES # BLD AUTO: 0.4 10*3/MM3 (ref 0.1–0.9)
MONOCYTES NFR BLD AUTO: 10 % (ref 5–12)
NEUTROPHILS # BLD AUTO: 2.28 10*3/MM3 (ref 1.7–7)
NEUTROPHILS NFR BLD AUTO: 56.9 % (ref 42.7–76)
NRBC BLD AUTO-RTO: 0 /100 WBC (ref 0–0.2)
PLATELET # BLD AUTO: 300 10*3/MM3 (ref 140–450)
RBC # BLD AUTO: 3.94 10*6/MM3 (ref 3.77–5.28)
RHEUMATOID FACT SERPL-ACNC: <10 IU/ML
TSH SERPL DL<=0.005 MIU/L-ACNC: 1.58 UIU/ML (ref 0.27–4.2)
WBC # BLD AUTO: 4.01 10*3/MM3 (ref 3.4–10.8)

## 2025-04-02 ENCOUNTER — TREATMENT (OUTPATIENT)
Dept: PHYSICAL THERAPY | Facility: CLINIC | Age: 65
End: 2025-04-02
Payer: COMMERCIAL

## 2025-04-02 DIAGNOSIS — R29.898 WEAKNESS OF LEFT SHOULDER: ICD-10-CM

## 2025-04-02 DIAGNOSIS — R68.89 ACTIVITY INTOLERANCE: ICD-10-CM

## 2025-04-02 DIAGNOSIS — M75.82 ROTATOR CUFF TENDONITIS, LEFT: Primary | ICD-10-CM

## 2025-04-02 DIAGNOSIS — M25.612 DECREASED SHOULDER MOBILITY, LEFT: ICD-10-CM

## 2025-04-02 NOTE — PROGRESS NOTES
Physical Therapy Daily Treatment Note  [facility] [place]   [address], [address]  [place] [address]  P: [phone]  F: [phone]    Patient: Micheline Fournier   : 1960  Referring practitioner: Funmilayo Sparrow MD  Date of Initial Visit: Type: THERAPY  Noted: 3/25/2025  Today's Date: 2025  Patient seen for 2 sessions       Visit Diagnoses:    ICD-10-CM ICD-9-CM   1. Rotator cuff tendonitis, left  M75.82 726.10   2. Weakness of left shoulder  R29.898 781.99   3. Decreased shoulder mobility, left  M25.612 719.51   4. Activity intolerance  R68.89 780.99         Micheline Fournier reports: Pt reports she is doing well. Pt notes her shoulder seems to be feeling better. Pt notes continued difficulty w/ OHA. No new/other complaints/comments noted.       Subjective     Objective   See Exercise, Manual, and Modality Logs for complete treatment.       Assessment/Plan  Pt presented to clinic for first f/u visit since IE. Pt demonstrated correct performance of prescribed HEP interventions w/ noted improvement in ROM and strength. Pt required little cueing for minor form corrections.   HEP was updated today to progress PT PoC to achieve outlined goals for pt rehab.       Plan:  Pt will continue with current plan of care to achieve outlined goals. Therapeutic interventions will be progressed where and when appropriate.        Timed:         Manual Therapy:    0     mins  38467;     Therapeutic Exercise:    15     mins  65465;     Neuromuscular Atilio:    10    mins  34180;    Therapeutic Activity:     8     mins  27267;     Gait Trainin     mins  75266;     Ultrasound:     0     mins  90028;    Ionto                               0    mins  24821  Self Care                       0     mins  25373  Traction     0     mins 50944      Un-Timed:  Canalith Repos    0     mins 66519  Electrical Stimulation:    0     mins  17659 ( );  Dry Needling     0     mins  self-pay  Traction     0     mins 34077        Timed Treatment:   33   mins   Total Treatment:     33   mins    Moe Prescott, PT  [place] License #: [id number]    Physical Therapist

## 2025-04-07 ENCOUNTER — OFFICE VISIT (OUTPATIENT)
Dept: FAMILY MEDICINE CLINIC | Facility: CLINIC | Age: 65
End: 2025-04-07
Payer: COMMERCIAL

## 2025-04-07 VITALS
HEART RATE: 73 BPM | BODY MASS INDEX: 24.15 KG/M2 | HEIGHT: 60 IN | DIASTOLIC BLOOD PRESSURE: 80 MMHG | WEIGHT: 123 LBS | OXYGEN SATURATION: 99 % | SYSTOLIC BLOOD PRESSURE: 132 MMHG

## 2025-04-07 DIAGNOSIS — E78.2 MIXED HYPERLIPIDEMIA: Primary | ICD-10-CM

## 2025-04-07 DIAGNOSIS — D64.9 ANEMIA, UNSPECIFIED TYPE: ICD-10-CM

## 2025-04-07 DIAGNOSIS — M25.50 ARTHRALGIA OF MULTIPLE JOINTS: ICD-10-CM

## 2025-04-07 NOTE — PROGRESS NOTES
"Chief Complaint  Hyperlipidemia (Follow up on blood work ) and Multiple joint pain    Subjective        Micheline Fournier presents to [facility]  History of Present Illness    History of Present Illness  The patient presents for a follow-up from her last visit on 03/03/2025 for cholesterol check, weight loss, and multiple joint pain.    She reports a general sense of well-being and has initiated physical therapy for her shoulder. She is tolerating her cholesterol medication without any adverse effects. She does not experience any muscle pain associated with the use of Lipitor. She has been making efforts to increase her water intake.    Her joint pain has shown improvement, although it remains intermittent. She does not experience any chest pain.    She has a history of anemia during her menstrual periods but has not had any recent episodes. Her dietary intake is minimal, and she abstains from meat consumption. She donated blood on 03/11/2025. She does not take any supplements. She is currently on vitamin D supplementation.    She reports no feelings of depression but experiences anxiety intermittently. 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    MEDICATIONS  Current: Lipitor, vitamin D     Objective   Vital Signs:  /80 (BP Location: Left arm, Patient Position: Sitting, Cuff Size: Adult)   Pulse 73   Ht 152.4 cm (60\")   Wt 55.8 kg (123 lb)   SpO2 99%   BMI 24.02 kg/m²   Estimated body mass index is 24.02 kg/m² as calculated from the following:    Height as of this encounter: 152.4 cm (60\").    Weight as of this encounter: 55.8 kg (123 lb).       BMI is within normal parameters. No other follow-up for BMI required.      Physical Exam   Result Review :    CBC   CBC          7/29/2024    09:34 3/3/2025    13:13 3/31/2025    08:15   CBC   WBC 3.1  3.5  4.01    RBC 4.35  4.55  3.94  "   Hemoglobin 13.1  13.0  11.4    Hematocrit 39.5  40.0  36.1    MCV 91  88  91.6    MCH 30.1  28.6  28.9    MCHC 33.2  32.5  31.6    RDW 12.8  14.6  14.8    Platelets 246  251  300      RHEUMFACTOR   SED RATE   Sed Rate   Date Value Ref Range Status   03/31/2025 2 0 - 30 mm/hr Final      LANETTE   CCP ANTIBODIES   CCP Antibodies IgG/IgA   Date Value Ref Range Status   03/31/2025 6 0 - 19 units Final     Comment:                               Negative               <20                            Weak positive      20 - 39                            Moderate positive  40 - 59                            Strong positive        >59                     Assessment and Plan   Diagnoses and all orders for this visit:    1. Mixed hyperlipidemia (Primary)    2. Arthralgia of multiple joints  -     CK    3. Anemia, unspecified type        Assessment & Plan  1. Mixed hyperlipidemia.  Her cardiac CT calcium score is 19,  She is tolerating her current cholesterol medication well without side effects. She is advised to continue her current cholesterol medication regimen. If she experiences any muscle pain, she should contact the office immediately.    2. Anemia.  Her hemoglobin level is 11.4, which is low. This drop in hemoglobin could be due to her recent blood donation on March 11. Her iron levels are normal. A recheck of her hemoglobin will be conducted during her next lab visit.    3.  Arthralgia,   patient complaining of multiple joint pain rheumatic arthritis panel were done on last visit.  Lab result discussed with patient as patient is giving history of improving her pain.  Her lab work were completely normal except for CK total was elevated.  .She is advised to drink plenty of water. A recheck of her muscle enzyme levels will be conducted today.    4.  Elevated CK  Elevated CK causes discussed with patient denies any muscle pain or joint pain also also improved we will recheck CK today          Follow-up  The patient will  follow up in 3 to 4 months.          Follow Up   There are no Patient Instructions on file for this visit.   Return in about 4 months (around 8/7/2025) for Recheck, Annual physical, COME 1 WK BEFORE FOR LABS ,PRIOR TO APPOINTMENT.  Patient was given instructions and counseling regarding her condition or for health maintenance advice. Please see specific information pulled into the AVS if appropriate.     Patient or patient representative verbalized consent for the use of Ambient Listening during the visit with  Funmilayo Sparrow MD for chart documentation. 4/7/2025  11:18 EDT

## 2025-04-08 ENCOUNTER — RESULTS FOLLOW-UP (OUTPATIENT)
Dept: FAMILY MEDICINE CLINIC | Facility: CLINIC | Age: 65
End: 2025-04-08
Payer: COMMERCIAL

## 2025-04-08 DIAGNOSIS — R74.8 ELEVATED CK: Primary | ICD-10-CM

## 2025-04-08 LAB — CK SERPL-CCNC: 353 U/L (ref 20–180)

## 2025-04-09 ENCOUNTER — TREATMENT (OUTPATIENT)
Dept: PHYSICAL THERAPY | Facility: CLINIC | Age: 65
End: 2025-04-09
Payer: COMMERCIAL

## 2025-04-09 DIAGNOSIS — R68.89 ACTIVITY INTOLERANCE: ICD-10-CM

## 2025-04-09 DIAGNOSIS — M75.82 ROTATOR CUFF TENDONITIS, LEFT: Primary | ICD-10-CM

## 2025-04-09 DIAGNOSIS — R29.898 WEAKNESS OF LEFT SHOULDER: ICD-10-CM

## 2025-04-09 DIAGNOSIS — M25.612 DECREASED SHOULDER MOBILITY, LEFT: ICD-10-CM

## 2025-04-09 NOTE — PROGRESS NOTES
Physical Therapy Daily Treatment Note  [facility] [place]   [address] [place] [address], [address]  [place] [address]  P: [phone]  F: [phone]    Patient: Micheline Fournier   : 1960  Referring practitioner: Funmilayo Sparrow MD  Date of Initial Visit: Type: THERAPY  Noted: 3/25/2025  Today's Date: 2025  Patient seen for 3 sessions       Visit Diagnoses:    ICD-10-CM ICD-9-CM   1. Rotator cuff tendonitis, left  M75.82 726.10   2. Weakness of left shoulder  R29.898 781.99   3. Decreased shoulder mobility, left  M25.612 719.51   4. Activity intolerance  R68.89 780.99         Micheline Fournier reports: Pt reports she is doing well. Pt notes occasional pain in her shoulder but feeling much better overall. Pt notes she feels the exercises have been very helpful. Pt notes increased levels in her blood work and was wondering if her pain was associated with that. No new/other complaints/comments noted.       Subjective     Objective   See Exercise, Manual, and Modality Logs for complete treatment.       Assessment/Plan  Pt Lab results reviewed. Creatine kinase levels were elevated. Pt results were already discussed w/ by PCP.   New interventions were added (prone y/t/w, TRX, roll ups) to continue w/ PT PoC for fxn strength and neuromuscular control of L UE to assist w/ (OHA, fxn ROM). Pt was able to perform requested interventions today w/out exasperation of L shoulder pain s/s. HEP was updated today to progress PT PoC to achieve outlined goals for pt rehab.         Plan:  Pt will continue with current plan of care to achieve outlined goals. Therapeutic interventions will be progressed where and when appropriate.        Timed:         Manual Therapy:    0     mins  81882;     Therapeutic Exercise:    12     mins  32367;     Neuromuscular Atilio:    10    mins  53531;    Therapeutic Activity:     10     mins  57223;     Gait Trainin     mins  60953;     Ultrasound:     0     mins   53513;    Ionto                               0    mins  98998  Self Care                       0     mins  47438  Traction     0     mins 37844      Un-Timed:  Canalith Repos    0     mins 35764  Electrical Stimulation:    0     mins  22705 ( );  Dry Needling     0     mins self-pay  Traction     0     mins 33821        Timed Treatment:   32   mins   Total Treatment:     32   mins    Moe Prescott, PT  [place] License #: [id number]    Physical Therapist

## 2025-04-14 PROBLEM — M20.41 ACQUIRED BILATERAL HAMMER TOES: Status: ACTIVE | Noted: 2024-12-12

## 2025-04-14 PROBLEM — S93.144A SUBLUXATION OF METATARSOPHALANGEAL JOINT OF LESSER TOE OF RIGHT FOOT: Status: ACTIVE | Noted: 2024-12-12

## 2025-04-14 PROBLEM — M20.42 ACQUIRED BILATERAL HAMMER TOES: Status: ACTIVE | Noted: 2024-12-12

## 2025-04-14 PROBLEM — M20.12 HALLUX ABDUCTO VALGUS, BILATERAL: Status: ACTIVE | Noted: 2024-12-12

## 2025-04-14 PROBLEM — M20.11 HALLUX ABDUCTO VALGUS, BILATERAL: Status: ACTIVE | Noted: 2024-12-12

## 2025-04-16 ENCOUNTER — TREATMENT (OUTPATIENT)
Dept: PHYSICAL THERAPY | Facility: CLINIC | Age: 65
End: 2025-04-16
Payer: COMMERCIAL

## 2025-04-16 DIAGNOSIS — M75.82 ROTATOR CUFF TENDONITIS, LEFT: Primary | ICD-10-CM

## 2025-04-16 DIAGNOSIS — R29.898 WEAKNESS OF LEFT SHOULDER: ICD-10-CM

## 2025-04-16 DIAGNOSIS — R68.89 ACTIVITY INTOLERANCE: ICD-10-CM

## 2025-04-16 DIAGNOSIS — M25.612 DECREASED SHOULDER MOBILITY, LEFT: ICD-10-CM

## 2025-04-16 NOTE — PROGRESS NOTES
"Physical Therapy Daily Treatment Note  [facility] [place]   [address], [address]  [place] [address]  P: [phone]  F: [phone]    Patient: Micheline Fournier   : 1960  Referring practitioner: Funmilayo Sparrow MD  Date of Initial Visit: Type: THERAPY  Noted: 3/25/2025  Today's Date: 2025  Patient seen for 4 sessions       Visit Diagnoses:    ICD-10-CM ICD-9-CM   1. Rotator cuff tendonitis, left  M75.82 726.10   2. Weakness of left shoulder  R29.898 781.99   3. Decreased shoulder mobility, left  M25.612 719.51   4. Activity intolerance  R68.89 780.99         Micheline Fournier reports: Pt reports she is doing fine. Pt notes overall her shoulder feels better but she has been having some \"twinges of pain\". Pt notes certain motions bother her, mostly if she sleeps on it poorly. Pt notes when her autoimmune issues seem to flare up, so does her shoulder pain. No new/other complaints/comments noted.       Subjective     Objective   See Exercise, Manual, and Modality Logs for complete treatment.       Assessment/Plan  Due to pt L shoulder s/s exasperation, interventions were regressed to pt tolerance today in order to avoid worsening s/s. Pt was able to perform requested interventions today w/out exasperation of L shoulder pain s/s.     Plan:  Pt will continue with current plan of care to achieve outlined goals. Therapeutic interventions will be progressed where and when appropriate.        Timed:         Manual Therapy:    0     mins  22016;     Therapeutic Exercise:    10     mins  43094;     Neuromuscular Atilio:    10    mins  51950;    Therapeutic Activity:     10     mins  97024;     Gait Trainin     mins  76561;     Ultrasound:     0     mins  76252;    Ionto                               0    mins  38488  Self Care                       0     mins  57738  Traction     0     mins 53423      Un-Timed:  Canalith Repos    0     mins 07683  Electrical Stimulation:    " 0     mins  68534 ( );  Dry Needling     0     mins self-pay  Traction     0     mins 16317        Timed Treatment:   30   mins   Total Treatment:     30   mins    Moe Prescott, PT  [place] License #: [id number]    Physical Therapist

## 2025-04-23 ENCOUNTER — TELEPHONE (OUTPATIENT)
Dept: PHYSICAL THERAPY | Facility: CLINIC | Age: 65
End: 2025-04-23
Payer: COMMERCIAL

## 2025-04-23 NOTE — TELEPHONE ENCOUNTER
Caller: Micheline Fournier    Relationship: Self         What was the call regarding: CALLED BACK AND SAID SHE WOULD JUST CANCEL APPT

## 2025-04-30 ENCOUNTER — TREATMENT (OUTPATIENT)
Dept: PHYSICAL THERAPY | Facility: CLINIC | Age: 65
End: 2025-04-30
Payer: COMMERCIAL

## 2025-04-30 DIAGNOSIS — R29.898 WEAKNESS OF LEFT SHOULDER: ICD-10-CM

## 2025-04-30 DIAGNOSIS — M75.82 ROTATOR CUFF TENDONITIS, LEFT: Primary | ICD-10-CM

## 2025-04-30 DIAGNOSIS — R68.89 ACTIVITY INTOLERANCE: ICD-10-CM

## 2025-04-30 DIAGNOSIS — M25.612 DECREASED SHOULDER MOBILITY, LEFT: ICD-10-CM

## 2025-04-30 NOTE — PROGRESS NOTES
"Physical Therapy Discharge/Re-Eval  [facility] [place]   [address], [address]  [place] [address]  P: [phone]  F: [phone]    Patient: Micheline Fournier   : 1960  Referring practitioner: Funmilayo Sparrow MD  Date of Initial Visit: Type: THERAPY  Noted: 3/25/2025  Today's Date: 2025  Patient seen for 5 sessions       Visit Diagnoses:    ICD-10-CM ICD-9-CM   1. Rotator cuff tendonitis, left  M75.82 726.10   2. Weakness of left shoulder  R29.898 781.99   3. Decreased shoulder mobility, left  M25.612 719.51   4. Activity intolerance  R68.89 780.99         Micheline Fournier reports: Pt reports she is doing well. Pt notes she has much less pain overall but has occasional \"twinges\". No new/other complaints/comments noted.       Subjective   Pt reported difficulties:  - self reported fxn status =  50/100% = 98%  - bathing/dressing: reaching across body, behind back = no issue   - OHA (lifting more than 2 pounds) = No issue   - sleeping = No issue   - impacts IADLs = no deficits noted     Objective   See Exercise, Manual, and Modality Logs for complete treatment.   Postural Observations  Seated posture: fair  Standing posture: fair  Correction of posture: makes symptoms better        Tenderness      Additional Tenderness Details  No TTP      Active Range of Motion   Left Shoulder   Flexion: 150 (8/10) degrees with pain = 180 0/10   Abduction: 160 (8/10) degrees with pain = 180 2/10   External rotation 90°: 60 degrees = 65 0/10      Right Shoulder   Flexion: WFL  Abduction: WFL  External rotation 90°: WFL     Additional Active Range of Motion Details  BTB IR   R = T4  L = T10, 8/10 pain = T4 0/10      Strength/Myotome Testing      Left Shoulder      Planes of Motion   Flexion: 4 (8-9/10) = 5 0/10  Abduction: 4 = 5 0/10  External rotation at 90°: 4+ = 5   Internal rotation at 90°: 4+ = 5      Right Shoulder      Planes of Motion   Flexion: 5   Abduction: 5   External rotation " at 90°: 5   Internal rotation at 90°: 5      Tests      Left Shoulder   Positive empty can. =   Negative Yergason's.      Right Shoulder   Negative empty can and Yergason's.            Assessment/Plan  Pt was re-assessed for progress w/ PT PoC today. Pt has met 100% of goals from PoC during re-evaluation. Pt is d/c from skilled PT services at this time w/ updated HEP. Therapist and patient are in agreement w/ this plan.         Goals  Plan Goals: STG: (achieve in 4+ weeks)  1. Pt will demo HEP compliance and attendance w/ scheduled therapy visits. = MET  2. Pt will demo pain free 4/5 MMT grades or better of L UE for fxn strength w/ IADLs. = MET  3. Pt will demo improved L UE  ROM by 10 degrees or more for improved fxn mobility w/ IADLs and work duties. = MET  4. Pt will report decreased pain from 10/10 at worst to 7/10 or less on pain scale for QoL and progression of PoC.  = MET (2/10     LTG: (achieve in 8+ weeks)   1. Pt will report a 20% increase or more in her self reported fxn status to demo increased fxn ability to complete IADLs and work duties. = MET  2. Pt will demo pain free 4+/5 MMT grades or better of LUE for fxn strength w/ IADLs. = MET  3. Pt will reduce Quick DASH score by 10% or more indicating little to no disability of the L UE. = MET  4. Pt will be negative for the L empty can test indicating improvement/resolution of c/c and efficacy of skilled PT interventions. = MET         Timed:         Manual Therapy:    0     mins  88470;     Therapeutic Exercise:    15     mins  50071;     Neuromuscular Atilio:    10    mins  18215;    Therapeutic Activity:     0     mins  13925;     Gait Trainin     mins  03136;     Ultrasound:     0     mins  82474;    Ionto                               0    mins  24074  Self Care                       0     mins  74779  Traction     0     mins 75659      Un-Timed:  Canalith Repos    0     mins 21977  Electrical Stimulation:    0     mins  05504 ( );  Dry  Needling     0     mins self-pay  Traction     0     mins 77516  PT Re-eval                       10         mins 27978      Timed Treatment:   25   mins   Total Treatment:     35   mins    Moe Prescott, PT  [place] License #: [id number]    Physical Therapist

## 2025-06-06 DIAGNOSIS — F41.9 ANXIETY AND DEPRESSION: ICD-10-CM

## 2025-06-06 DIAGNOSIS — F41.9 ANXIETY: ICD-10-CM

## 2025-06-06 DIAGNOSIS — F32.A ANXIETY AND DEPRESSION: ICD-10-CM

## 2025-06-06 RX ORDER — ESCITALOPRAM OXALATE 10 MG/1
10 TABLET ORAL DAILY
Qty: 90 TABLET | Refills: 1 | Status: SHIPPED | OUTPATIENT
Start: 2025-06-06

## 2025-06-06 RX ORDER — TRAZODONE HYDROCHLORIDE 50 MG/1
25 TABLET ORAL NIGHTLY
Qty: 45 TABLET | Refills: 1 | Status: SHIPPED | OUTPATIENT
Start: 2025-06-06

## 2025-06-22 DIAGNOSIS — I10 ESSENTIAL HYPERTENSION: Chronic | ICD-10-CM

## 2025-06-22 DIAGNOSIS — R00.2 PALPITATIONS: Chronic | ICD-10-CM

## 2025-06-23 RX ORDER — SPIRONOLACTONE 25 MG/1
25 TABLET ORAL DAILY
Qty: 90 TABLET | Refills: 0 | Status: SHIPPED | OUTPATIENT
Start: 2025-06-23

## 2025-06-23 RX ORDER — NIFEDIPINE 90 MG/1
90 TABLET, FILM COATED, EXTENDED RELEASE ORAL DAILY
Qty: 90 TABLET | Refills: 0 | Status: SHIPPED | OUTPATIENT
Start: 2025-06-23

## 2025-06-24 ENCOUNTER — OFFICE VISIT (OUTPATIENT)
Dept: FAMILY MEDICINE CLINIC | Facility: CLINIC | Age: 65
End: 2025-06-24
Payer: COMMERCIAL

## 2025-06-24 VITALS
HEIGHT: 60 IN | WEIGHT: 120 LBS | SYSTOLIC BLOOD PRESSURE: 170 MMHG | DIASTOLIC BLOOD PRESSURE: 80 MMHG | HEART RATE: 67 BPM | OXYGEN SATURATION: 97 % | BODY MASS INDEX: 23.56 KG/M2 | RESPIRATION RATE: 18 BRPM

## 2025-06-24 DIAGNOSIS — Z12.11 ENCOUNTER FOR COLORECTAL CANCER SCREENING: ICD-10-CM

## 2025-06-24 DIAGNOSIS — Z78.0 POST-MENOPAUSAL: ICD-10-CM

## 2025-06-24 DIAGNOSIS — F32.A ANXIETY AND DEPRESSION: ICD-10-CM

## 2025-06-24 DIAGNOSIS — F41.9 ANXIETY AND DEPRESSION: ICD-10-CM

## 2025-06-24 DIAGNOSIS — Z00.00 ROUTINE PHYSICAL EXAMINATION: Primary | ICD-10-CM

## 2025-06-24 DIAGNOSIS — Z12.12 ENCOUNTER FOR COLORECTAL CANCER SCREENING: ICD-10-CM

## 2025-06-24 DIAGNOSIS — R74.8 ELEVATED CK: ICD-10-CM

## 2025-06-24 DIAGNOSIS — E78.2 MIXED HYPERLIPIDEMIA: ICD-10-CM

## 2025-06-24 PROCEDURE — 99397 PER PM REEVAL EST PAT 65+ YR: CPT | Performed by: FAMILY MEDICINE

## 2025-06-24 PROCEDURE — 99214 OFFICE O/P EST MOD 30 MIN: CPT | Performed by: FAMILY MEDICINE

## 2025-06-24 RX ORDER — ESCITALOPRAM OXALATE 10 MG/1
15 TABLET ORAL DAILY
Qty: 135 TABLET | Refills: 1 | Status: SHIPPED | OUTPATIENT
Start: 2025-06-24

## 2025-06-24 NOTE — PROGRESS NOTES
"Chief Complaint  Annual Exam (Physical)    Subjective        Micheline Fournier presents to [facility]  History of Present Illness came here for annual exam and follow-up on anxiety and depression.  She is currently on Lexapro denies any problem with the medication.  Patient with history of hypertension and palpitations seeing cardiology.  Also she has history of hyperlipidemia started on statin discontinue statin secondary to elevated CK.  Patient denies any muscle pain her fatigue is also improved.       Objective   Vital Signs:  /80   Pulse 67   Resp 18   Ht 152.4 cm (60\")   Wt 54.4 kg (120 lb)   SpO2 97%   BMI 23.44 kg/m²   Estimated body mass index is 23.44 kg/m² as calculated from the following:    Height as of this encounter: 152.4 cm (60\").    Weight as of this encounter: 54.4 kg (120 lb).       BMI is within normal parameters. No other follow-up for BMI required.      Physical Exam  Constitutional:       General: She is not in acute distress.     Appearance: Normal appearance. She is well-developed.   HENT:      Head: Normocephalic and atraumatic.      Right Ear: Tympanic membrane normal.      Left Ear: Tympanic membrane normal.      Mouth/Throat:      Mouth: Mucous membranes are moist.   Eyes:      General:         Right eye: No discharge.         Left eye: No discharge.      Extraocular Movements: Extraocular movements intact.      Pupils: Pupils are equal, round, and reactive to light.   Cardiovascular:      Rate and Rhythm: Normal rate and regular rhythm.      Pulses: Normal pulses.      Heart sounds: Normal heart sounds.   Pulmonary:      Effort: Pulmonary effort is normal.      Breath sounds: Normal breath sounds. No wheezing or rales.   Abdominal:      General: Bowel sounds are normal.      Palpations: Abdomen is soft. There is no mass.      Tenderness: There is no abdominal tenderness.   Musculoskeletal:      Cervical back: Normal range of motion and neck " supple.      Right lower leg: No edema.      Left lower leg: No edema.   Lymphadenopathy:      Cervical: No cervical adenopathy.   Neurological:      General: No focal deficit present.      Mental Status: She is alert and oriented to person, place, and time.        Result Review :                   Assessment and Plan   Diagnoses and all orders for this visit:    1. Routine physical examination (Primary)    2. Encounter for colorectal cancer screening  -     Ambulatory Referral to Gastroenterology    3. Post-menopausal  -     DEXA Bone Density Axial; Future    4. Anxiety and depression  -     escitalopram (LEXAPRO) 10 MG tablet; Take 1.5 tablets by mouth Daily.  Dispense: 135 tablet; Refill: 1    5. Elevated CK  -     CK  -     CK MB    6. Mixed hyperlipidemia  -     Comprehensive Metabolic Panel  -     Lipid Panel      Micheline Fournier is a 65-year-old female patient seen today for  Routine physical, preventive care discussed with patient she is due for colonoscopy we will refer her to GI.  Also I will schedule her for bone density advised her to continue taking vitamin D3 supplement and calcium every day.  Weightbearing exercise is also recommended to her.  She is also seen today for follow-up on  Anxiety and depression.  Currently on 10 mg Lexapro her symptoms is improved denies any problem with the medication continue same.  Elevated CK, patient with history of mildly elevated CK we will recheck her CK again.  Mixed hyperlipidemia, not on medication secondary to elevated CK.  Will recheck CMP and lipids today.  We also discussed the nonstatin medication she has upcoming appointment with her cardiologist will discuss the medication with them also.  Follow-up as needed or in 6 months          Follow Up   There are no Patient Instructions on file for this visit.   No follow-ups on file.  Patient was given instructions and counseling regarding her condition or for health maintenance advice. Please see specific  information pulled into the AVS if appropriate.

## 2025-06-25 LAB
ALBUMIN SERPL-MCNC: 4.8 G/DL (ref 3.9–4.9)
ALP SERPL-CCNC: 72 IU/L (ref 44–121)
ALT SERPL-CCNC: 16 IU/L (ref 0–32)
AST SERPL-CCNC: 17 IU/L (ref 0–40)
BILIRUB SERPL-MCNC: 0.6 MG/DL (ref 0–1.2)
BUN SERPL-MCNC: 13 MG/DL (ref 8–27)
BUN/CREAT SERPL: 14 (ref 12–28)
CALCIUM SERPL-MCNC: 10 MG/DL (ref 8.7–10.3)
CHLORIDE SERPL-SCNC: 103 MMOL/L (ref 96–106)
CHOLEST SERPL-MCNC: 247 MG/DL (ref 100–199)
CK MB SERPL-MCNC: 3.5 NG/ML (ref 0–5.3)
CK SERPL-CCNC: 439 U/L (ref 32–182)
CO2 SERPL-SCNC: 25 MMOL/L (ref 20–29)
CREAT SERPL-MCNC: 0.94 MG/DL (ref 0.57–1)
EGFRCR SERPLBLD CKD-EPI 2021: 67 ML/MIN/1.73
GLOBULIN SER CALC-MCNC: 1.9 G/DL (ref 1.5–4.5)
GLUCOSE SERPL-MCNC: 84 MG/DL (ref 70–99)
HDLC SERPL-MCNC: 82 MG/DL
LDLC SERPL CALC-MCNC: 152 MG/DL (ref 0–99)
POTASSIUM SERPL-SCNC: 4.3 MMOL/L (ref 3.5–5.2)
PROT SERPL-MCNC: 6.7 G/DL (ref 6–8.5)
SODIUM SERPL-SCNC: 144 MMOL/L (ref 134–144)
TRIGL SERPL-MCNC: 79 MG/DL (ref 0–149)
VLDLC SERPL CALC-MCNC: 13 MG/DL (ref 5–40)

## 2025-06-26 ENCOUNTER — RESULTS FOLLOW-UP (OUTPATIENT)
Dept: FAMILY MEDICINE CLINIC | Facility: CLINIC | Age: 65
End: 2025-06-26

## 2025-06-26 ENCOUNTER — TELEPHONE (OUTPATIENT)
Dept: FAMILY MEDICINE CLINIC | Facility: CLINIC | Age: 65
End: 2025-06-26
Payer: COMMERCIAL

## 2025-06-26 ENCOUNTER — TELEPHONE (OUTPATIENT)
Dept: FAMILY MEDICINE CLINIC | Facility: CLINIC | Age: 65
End: 2025-06-26

## 2025-06-26 DIAGNOSIS — M25.50 ARTHRALGIA OF MULTIPLE JOINTS: ICD-10-CM

## 2025-06-26 DIAGNOSIS — R74.8 ELEVATED CK: Primary | ICD-10-CM

## 2025-06-26 NOTE — TELEPHONE ENCOUNTER
LVM for pt to call back, I do not have any paperwork for her to even fill out.    Luisana Hazel MA  06/26/25, 13:44 EDT

## 2025-06-26 NOTE — TELEPHONE ENCOUNTER
SPW pt, calling [facility] again to have them send over [facility] paperwork, were supposed to send Monday/Tuesday, but haven't received. Pt will be calling them now    Luisana Hazel MA  06/26/25, 13:58 EDT

## 2025-06-26 NOTE — TELEPHONE ENCOUNTER
Caller: Micheline Fournier    Relationship to patient: Self    Best call back number: [phone]     Patient is needing: CALLING TO CHECK STATUS OF FMLA PAPERWORK PLEASE CALL AND ADVISE           [employer]: Preventive Care And Screening: Tobacco Use: Screening And Cessation Intervention: Patient screened for tobacco use and is an ex/non-smoker

## 2025-06-26 NOTE — TELEPHONE ENCOUNTER
Hub staff attempted to follow warm transfer process and was unsuccessful     Caller: Micheline Fournier    Relationship to patient: Self    Best call back number: [phone]     Patient is needing: PATIENT CALLED IN TO REPORT THAT IT WILL BE 1-2 DAYS BEFORE OR AFTER JULY 1, THAT THEY WILL FAX OVER THE PAPERWORK. NO NEED TO CALL BACK, PATIENT JUST RELAYING THIS INFO.

## 2025-06-26 NOTE — TELEPHONE ENCOUNTER
Hub staff attempted to follow warm transfer process and was unsuccessful     Caller: Micheline Fournier    Relationship to patient: Self    Best call back number: [phone]     Patient is needing: PATIENT IS RETURNING [facility] CALL

## 2025-07-09 ENCOUNTER — TELEPHONE (OUTPATIENT)
Dept: FAMILY MEDICINE CLINIC | Facility: CLINIC | Age: 65
End: 2025-07-09
Payer: COMMERCIAL

## 2025-07-09 NOTE — TELEPHONE ENCOUNTER
Spoke with pt son about FMLA paperwork informed either can drop off at office or upload through [employer] due to no secure email.

## 2025-07-17 ENCOUNTER — TELEPHONE (OUTPATIENT)
Dept: FAMILY MEDICINE CLINIC | Facility: CLINIC | Age: 65
End: 2025-07-17
Payer: COMMERCIAL

## 2025-07-17 NOTE — TELEPHONE ENCOUNTER
Caller: TODD CAN    Relationship:     Best call back number: [phone]         Who are you requesting to speak with (clinical staff, provider,  specific staff member): PCP OR CLINICAL      What was the call regarding: WILL BE FAXING OVER PAPERWORK FOR FMLA WITH QUESTIONS FOR DR. ASTORGA TO ANSWER AND SEND BACK TODAY.

## 2025-07-18 ENCOUNTER — TELEPHONE (OUTPATIENT)
Dept: FAMILY MEDICINE CLINIC | Facility: CLINIC | Age: 65
End: 2025-07-18
Payer: COMMERCIAL

## 2025-07-18 NOTE — TELEPHONE ENCOUNTER
SPW pt and was able to go through questions with patient and have the form ready for signing and faxing by end of day.

## 2025-08-18 ENCOUNTER — HOSPITAL ENCOUNTER (OUTPATIENT)
Dept: BONE DENSITY | Facility: HOSPITAL | Age: 65
Discharge: HOME | End: 2025-08-18
Admitting: FAMILY MEDICINE
Payer: COMMERCIAL

## 2025-08-18 DIAGNOSIS — Z78.0 POST-MENOPAUSAL: ICD-10-CM

## 2025-08-18 PROCEDURE — 77080 DXA BONE DENSITY AXIAL: CPT

## 2025-08-21 ENCOUNTER — OFFICE VISIT (OUTPATIENT)
Age: 65
End: 2025-08-21
Payer: COMMERCIAL

## 2025-08-21 VITALS
OXYGEN SATURATION: 96 % | BODY MASS INDEX: 24.35 KG/M2 | DIASTOLIC BLOOD PRESSURE: 86 MMHG | SYSTOLIC BLOOD PRESSURE: 134 MMHG | HEART RATE: 68 BPM | HEIGHT: 60 IN | WEIGHT: 124 LBS

## 2025-08-21 DIAGNOSIS — I10 PRIMARY HYPERTENSION: ICD-10-CM

## 2025-08-21 DIAGNOSIS — R00.2 PALPITATIONS: Primary | Chronic | ICD-10-CM

## 2025-08-21 PROCEDURE — 99214 OFFICE O/P EST MOD 30 MIN: CPT | Performed by: INTERNAL MEDICINE

## 2025-08-21 PROCEDURE — 93000 ELECTROCARDIOGRAM COMPLETE: CPT | Performed by: INTERNAL MEDICINE
